# Patient Record
Sex: MALE | Race: WHITE | Employment: FULL TIME | URBAN - METROPOLITAN AREA
[De-identification: names, ages, dates, MRNs, and addresses within clinical notes are randomized per-mention and may not be internally consistent; named-entity substitution may affect disease eponyms.]

---

## 2017-02-23 DIAGNOSIS — N40.0 BENIGN PROSTATIC HYPERPLASIA WITHOUT LOWER URINARY TRACT SYMPTOMS: ICD-10-CM

## 2017-02-27 RX ORDER — SILODOSIN 8 MG/1
CAPSULE ORAL
Qty: 30 CAPSULE | Refills: 1 | Status: SHIPPED | OUTPATIENT
Start: 2017-02-27 | End: 2018-08-27

## 2018-05-09 ENCOUNTER — INITIAL CONSULT (OUTPATIENT)
Dept: PULMONOLOGY | Age: 63
End: 2018-05-09

## 2018-05-09 VITALS
BODY MASS INDEX: 36.64 KG/M2 | SYSTOLIC BLOOD PRESSURE: 126 MMHG | OXYGEN SATURATION: 92 % | WEIGHT: 255.95 LBS | HEIGHT: 70 IN | DIASTOLIC BLOOD PRESSURE: 88 MMHG | HEART RATE: 84 BPM

## 2018-05-09 DIAGNOSIS — E66.9 OBESITY (BMI 30.0-34.9): ICD-10-CM

## 2018-05-09 DIAGNOSIS — G47.33 OSA (OBSTRUCTIVE SLEEP APNEA): ICD-10-CM

## 2018-05-09 DIAGNOSIS — J44.9 CHRONIC OBSTRUCTIVE PULMONARY DISEASE, UNSPECIFIED COPD TYPE (HCC): ICD-10-CM

## 2018-05-09 PROCEDURE — 99214 OFFICE O/P EST MOD 30 MIN: CPT | Performed by: INTERNAL MEDICINE

## 2018-05-09 RX ORDER — LEVOCETIRIZINE DIHYDROCHLORIDE 5 MG/1
5 TABLET, FILM COATED ORAL NIGHTLY
COMMUNITY
End: 2018-08-27

## 2018-05-09 RX ORDER — FLUTICASONE PROPIONATE 50 MCG
SPRAY, SUSPENSION (ML) NASAL
Refills: 4 | COMMUNITY
Start: 2018-04-14 | End: 2020-11-02

## 2018-05-09 RX ORDER — AZELASTINE 1 MG/ML
SPRAY, METERED NASAL
Refills: 4 | COMMUNITY
Start: 2018-04-14 | End: 2018-08-27

## 2018-05-09 RX ORDER — FLUTICASONE FUROATE AND VILANTEROL 200; 25 UG/1; UG/1
POWDER RESPIRATORY (INHALATION)
COMMUNITY
End: 2020-11-02

## 2018-05-09 ASSESSMENT — ENCOUNTER SYMPTOMS
GASTROINTESTINAL NEGATIVE: 1
RESPIRATORY NEGATIVE: 1
EYES NEGATIVE: 1

## 2018-07-03 ENCOUNTER — OFFICE VISIT (OUTPATIENT)
Dept: CARDIOLOGY CLINIC | Age: 63
End: 2018-07-03

## 2018-07-03 VITALS
WEIGHT: 261 LBS | OXYGEN SATURATION: 93 % | DIASTOLIC BLOOD PRESSURE: 82 MMHG | BODY MASS INDEX: 37.45 KG/M2 | HEART RATE: 81 BPM | SYSTOLIC BLOOD PRESSURE: 118 MMHG

## 2018-07-03 DIAGNOSIS — R60.9 EDEMA, UNSPECIFIED TYPE: Primary | ICD-10-CM

## 2018-07-03 DIAGNOSIS — R07.9 CHEST PAIN, UNSPECIFIED TYPE: ICD-10-CM

## 2018-07-03 PROCEDURE — 99204 OFFICE O/P NEW MOD 45 MIN: CPT | Performed by: INTERNAL MEDICINE

## 2018-07-03 PROCEDURE — 93000 ELECTROCARDIOGRAM COMPLETE: CPT | Performed by: INTERNAL MEDICINE

## 2018-07-03 RX ORDER — ALBUTEROL SULFATE 90 UG/1
AEROSOL, METERED RESPIRATORY (INHALATION)
COMMUNITY
End: 2018-08-02 | Stop reason: SDUPTHER

## 2018-07-03 NOTE — PROGRESS NOTES
for Wheezing or Shortness of Breath 360 mL 0    traZODone (DESYREL) 50 MG tablet Take 1 tablet by mouth nightly 20 tablet 0    RAPAFLO 8 MG CAPS TAKE 1 CAPSULE BY MOUTH ONE TIME A DAY  30 capsule 1    meloxicam (MOBIC) 7.5 MG tablet TAKE 1 TABLET BY MOUTH ONE TIME A DAY  30 tablet 4    triamterene-hydrochlorothiazide (MAXZIDE-25) 37.5-25 MG per tablet TAKE 1 TABLET BY MOUTH ONE TIME A DAY  30 tablet 4    albuterol sulfate  (90 BASE) MCG/ACT inhaler Inhale 2 puffs into the lungs every 4 hours as needed for Wheezing 1 Inhaler 5    montelukast (SINGULAIR) 10 MG tablet Take 1 tablet by mouth daily 30 tablet 5    loratadine-pseudoephedrine (CLARITIN-D 12 HOUR) 5-120 MG per tablet Take 1 tablet by mouth 2 times daily 60 tablet 2    terazosin (HYTRIN) 1 MG capsule Take 1 mg by mouth nightly.          No current facility-administered medications for this visit.           Review of Systems:    · Constitutional: No Fever or Weight Loss    · Eyes: No Decreased Vision  · ENT: No Headaches, Hearing Loss or Vertigo  · Cardiovascular: + chest pain, dyspnea on exertion, palpitations or loss of consciousness  · Respiratory: No cough or wheezing    · Gastrointestinal: No abdominal pain, appetite loss, blood in stools, constipation, diarrhea or heartburn  · Genitourinary: No dysuria, trouble voiding, or hematuria  · Musculoskeletal: No gait disturbance, weakness or joint complaints  · Integumentary: No rash or pruritis  · Neurological: No TIA or stroke symptoms  · Psychiatric: No anxiety or depression  · Endocrine: No malaise, fatigue or temperature intolerance  · Hematologic/Lymphatic: No bleeding problems, blood clots or swollen lymph nodes  · Allergic/Immunologic: No nasal congestion or hives  All systems negative except as marked.      ·    ·    ·      Physical Examination:    Vitals:    07/03/18 1701   BP: 118/82   Pulse: 81   SpO2: 93%    rr 14  afebile    Wt Readings from Last 3 Encounters:   07/03/18 261 lb CREATININE  0.8           Recent Labs       09/28/16  0010    AST  12*    ALT  10    BILITOT  0.4    ALKPHOS  124       No results for input(s): TROPONINI in the last 72 hours. No results found for: BNP  No results found for: INR, PROTIME        EKG:nsr     Chest Xray:nad     ECHO:pending  Labs, echo, meds reviewed  Assessment:      Recommendations:     1. Chest pain: out patient stress test and echo  2. Leg swelling: venous doppler ordered,  3. HTN: stable  4. Hearing loss: stable  5. Sleep apnea stable  6. Asthma: relatively less controlled,he get very short of breath, will recommend to follow up with pulmonary  7.  Health maintenance: exerise and diet  All labs, medications and tests reviewed, continue all other medications of all above medical condition listed as is.          Rowdy Cobb MD,   Rowdy Cobb MD, 7/3/2018 5:43 PM

## 2018-07-09 ENCOUNTER — HOSPITAL ENCOUNTER (OUTPATIENT)
Dept: PULMONOLOGY | Age: 63
Discharge: HOME | End: 2018-07-09
Attending: INTERNAL MEDICINE | Admitting: INTERNAL MEDICINE

## 2018-07-10 ENCOUNTER — OFFICE VISIT (OUTPATIENT)
Dept: PULMONOLOGY | Age: 63
End: 2018-07-10

## 2018-07-10 VITALS
WEIGHT: 258.8 LBS | HEIGHT: 70 IN | BODY MASS INDEX: 37.05 KG/M2 | HEART RATE: 103 BPM | OXYGEN SATURATION: 91 % | RESPIRATION RATE: 18 BRPM | SYSTOLIC BLOOD PRESSURE: 122 MMHG | DIASTOLIC BLOOD PRESSURE: 64 MMHG

## 2018-07-10 DIAGNOSIS — G47.33 OSA (OBSTRUCTIVE SLEEP APNEA): ICD-10-CM

## 2018-07-10 DIAGNOSIS — R06.09 DYSPNEA ON EXERTION: ICD-10-CM

## 2018-07-10 DIAGNOSIS — E66.9 OBESITY (BMI 30.0-34.9): ICD-10-CM

## 2018-07-10 DIAGNOSIS — J44.9 CHRONIC OBSTRUCTIVE PULMONARY DISEASE, UNSPECIFIED COPD TYPE (HCC): ICD-10-CM

## 2018-07-10 PROCEDURE — 99214 OFFICE O/P EST MOD 30 MIN: CPT | Performed by: INTERNAL MEDICINE

## 2018-07-10 ASSESSMENT — ENCOUNTER SYMPTOMS
SHORTNESS OF BREATH: 1
GASTROINTESTINAL NEGATIVE: 1
EYES NEGATIVE: 1

## 2018-07-10 NOTE — PROGRESS NOTES
current facility-administered medications for this visit. Allergies   Allergen Reactions    Prednisone        Past Medical History:   Diagnosis Date    Arthritis     Asthma     Chronic back pain     Hearing loss     Hypertension     Ringing in the ears     from measels and mumps       Past Surgical History:   Procedure Laterality Date    COLONOSCOPY  2005    polyp removed Dr Jesus Barksdale History     Social History    Marital status:      Spouse name: N/A    Number of children: N/A    Years of education: N/A     Social History Main Topics    Smoking status: Former Smoker    Smokeless tobacco: Never Used      Comment: See Soft Chart     Alcohol use No    Drug use: Yes    Sexual activity: Yes     Partners: Female     Other Topics Concern    None     Social History Narrative    None       Review of Systems   Constitutional: Negative. HENT: Negative. Eyes: Negative. Respiratory: Positive for shortness of breath. Cardiovascular: Negative. Gastrointestinal: Negative. Genitourinary: Negative. Musculoskeletal: Negative. Skin: Negative. Neurological: Negative. Endo/Heme/Allergies: Negative. Psychiatric/Behavioral: Negative. Objective:     Vitals:    07/10/18 1450   BP: 122/64   Pulse: 103   Resp: 18   SpO2: 91%     Body mass index is 37.13 kg/m². No flowsheet data found. Mallampati 3    Physical Exam   Constitutional: He is oriented to person, place, and time. He appears well-developed and well-nourished. Obese   HENT:   Head: Normocephalic and atraumatic. Eyes: Conjunctivae are normal. Pupils are equal, round, and reactive to light. Neck: Normal range of motion. Neck supple. Cardiovascular: Normal rate, regular rhythm and normal heart sounds. Pulmonary/Chest: Effort normal and breath sounds normal.   Abdominal: Soft.  Bowel sounds are normal.   Musculoskeletal: Normal range

## 2018-07-11 ENCOUNTER — PROCEDURE VISIT (OUTPATIENT)
Dept: CARDIOLOGY CLINIC | Age: 63
End: 2018-07-11

## 2018-07-11 DIAGNOSIS — R07.9 CHEST PAIN, UNSPECIFIED TYPE: ICD-10-CM

## 2018-07-11 DIAGNOSIS — R60.9 EDEMA, UNSPECIFIED TYPE: ICD-10-CM

## 2018-07-11 LAB
LV EF: 62 %
LVEF MODALITY: NORMAL

## 2018-07-11 PROCEDURE — 78452 HT MUSCLE IMAGE SPECT MULT: CPT | Performed by: INTERNAL MEDICINE

## 2018-07-11 PROCEDURE — 93015 CV STRESS TEST SUPVJ I&R: CPT | Performed by: INTERNAL MEDICINE

## 2018-07-11 PROCEDURE — A9500 TC99M SESTAMIBI: HCPCS | Performed by: INTERNAL MEDICINE

## 2018-07-12 ENCOUNTER — TELEPHONE (OUTPATIENT)
Dept: CARDIOLOGY CLINIC | Age: 63
End: 2018-07-12

## 2018-07-12 NOTE — TELEPHONE ENCOUNTER
Left message with pts nuclear results    Summary    Supervising physician Dr. Salome Johnson .   Uriel Mylar study suggestive of normal perfusion in distribution of all    coronaries and normal left ventricular size and function. Ejection fraction    is 62 %.    Exercise tolerance is good. Normal hemodynamic response to exercise.        Recommendation    Continue lifestyle and risk modification for primary prevention.

## 2018-07-20 ENCOUNTER — PROCEDURE VISIT (OUTPATIENT)
Dept: CARDIOLOGY CLINIC | Age: 63
End: 2018-07-20

## 2018-07-20 DIAGNOSIS — R60.9 EDEMA, UNSPECIFIED TYPE: ICD-10-CM

## 2018-07-20 DIAGNOSIS — R07.9 CHEST PAIN, UNSPECIFIED TYPE: Primary | ICD-10-CM

## 2018-07-20 DIAGNOSIS — M79.89 LEG SWELLING: Primary | ICD-10-CM

## 2018-07-20 LAB
LV EF: 58 %
LVEF MODALITY: NORMAL

## 2018-07-20 PROCEDURE — 93970 EXTREMITY STUDY: CPT | Performed by: INTERNAL MEDICINE

## 2018-07-20 PROCEDURE — 93306 TTE W/DOPPLER COMPLETE: CPT | Performed by: INTERNAL MEDICINE

## 2018-07-23 ENCOUNTER — TELEPHONE (OUTPATIENT)
Dept: CARDIOLOGY CLINIC | Age: 63
End: 2018-07-23

## 2018-08-02 ENCOUNTER — OFFICE VISIT (OUTPATIENT)
Dept: CARDIOLOGY CLINIC | Age: 63
End: 2018-08-02

## 2018-08-02 VITALS
BODY MASS INDEX: 37.31 KG/M2 | SYSTOLIC BLOOD PRESSURE: 124 MMHG | HEART RATE: 100 BPM | DIASTOLIC BLOOD PRESSURE: 80 MMHG | WEIGHT: 260.6 LBS | HEIGHT: 70 IN

## 2018-08-02 DIAGNOSIS — R06.00 DYSPNEA, UNSPECIFIED TYPE: Primary | ICD-10-CM

## 2018-08-02 PROCEDURE — 99214 OFFICE O/P EST MOD 30 MIN: CPT | Performed by: INTERNAL MEDICINE

## 2018-08-02 NOTE — PROGRESS NOTES
facility-administered medications for this visit. Allergies: Prednisone  Past Medical History:   Diagnosis Date    Arthritis     Asthma     Chronic back pain     Hearing loss     History of Doppler ultrasound 07/20/2018    No significant venous insufficiency, No DVT or SVT    History of echocardiogram 07/20/2018    EF 55-60%, No significant valvular disease, no pericardial effusion, Mild PHTN at 42 mmHg.  History of nuclear stress test 07/11/2018    EF 62%    Hypertension     Ringing in the ears     from measels and mumps     Past Surgical History:   Procedure Laterality Date    COLONOSCOPY  2005    polyp removed Dr Washburn Standing History   Problem Relation Age of Onset    High Blood Pressure Mother     Osteoporosis Mother     Heart Disease Father     Colon Cancer Brother      Social History   Substance Use Topics    Smoking status: Former Smoker    Smokeless tobacco: Never Used      Comment: See Soft Chart     Alcohol use No      [unfilled]  Review of Systems:   · Constitutional: No Fever or Weight Loss   · Eyes: No Decreased Vision  · ENT: No Headaches, Hearing Loss or Vertigo  · Cardiovascular: No chest pain, +dyspnea on exertion, palpitations or loss of consciousness  · Respiratory: No cough or wheezing    · Gastrointestinal: No abdominal pain, appetite loss, blood in stools, constipation, diarrhea or heartburn  · Genitourinary: No dysuria, trouble voiding, or hematuria  · Musculoskeletal:  No gait disturbance, weakness or joint complaints  · Integumentary: No rash or pruritis  · Neurological: No TIA or stroke symptoms  · Psychiatric: No anxiety or depression  · Endocrine: No malaise, fatigue or temperature intolerance  · Hematologic/Lymphatic: No bleeding problems, blood clots or swollen lymph nodes  · Allergic/Immunologic: No nasal congestion or hives  All systems negative except as marked.    Objective:  /80 (Site: Left Arm, Position: Sitting, Cuff Size: Large Adult)   Pulse 100   Ht 5' 10\" (1.778 m)   Wt 260 lb 9.6 oz (118.2 kg)   BMI 37.39 kg/m²   Wt Readings from Last 3 Encounters:   08/02/18 260 lb 9.6 oz (118.2 kg)   07/10/18 258 lb 12.8 oz (117.4 kg)   07/03/18 261 lb (118.4 kg)     Body mass index is 37.39 kg/m². GENERAL - Alert, oriented, pleasant, in no apparent distress,normal grooming  HEENT  pupils are reactive to light and accomodation, cornea intact, conjunctive normal color, ears are normal in exam,throat exam in normal, teeth, gum and palate are normal, oral mucosa is normal without any notation of pallor or cyanosis  Neck - Supple. No jugular venous distention noted. No carotid bruits, no apical -carotid delay  Respiratory:  Normal breath sounds, No respiratory distress, No wheezing, No chest tenderness. ,no use of accessory muscles, diaphragm movement is normal  Cardiovascular: (PMI) apex non displaced,no lifts no thrills, no s3,no s4, Normal heart rate, Normal rhythm, No murmurs, No rubs, No gallops. Carotid arteries pulse and amplitude are normal no bruit, no abdominal bruit noted ( normal abdominal aorta ausculation), femoral arteries pulse and amplitude are normal no bruit, pedal pulses are normal  Femoral pulses have normal amplitude, no bruits   Extremities - No cyanosis, clubbing, or significant edema, no varicose veins    Abdomen  No masses, tenderness, or organomegaly, no hepato-splenomegally, no bruits  Musculoskeletal  No significant edema, no kyphosis or scoliosis, no deformity in any extremity noted, muscle strength and tone are normal  Skin: no ulcer,no scar,no stasis dermatitis   Neurologic  alert oriented times 3,Cranial nerves II through XII are grossly intact. There were no gross focal neurologic abnormalities.  All sensory and motor nerves examined and were normal  Psychiatric: normal mood and affect    Lab Results   Component Value Date    CKTOTAL 115 03/19/2012    CKMB 0.4 03/19/2012

## 2018-08-02 NOTE — PROGRESS NOTES
Patient here in office and educated on [facility], schedule for 08/09/2018 @ 1000, with arrival @ 0800, @ [facility]; risk explained; and consents signed. Also copy of orders given for labs and CXR due 08/08/2018 at [facility]. Instruction given to patient to :  NPO after midnight the night before procedure; call hospital at [phone] to pre-register. May take rest of morning meds of procedure except maxide. . Patient voiced understanding. Copies of consent & info sheet given to [facility] for scanning.

## 2018-08-07 ENCOUNTER — HOSPITAL ENCOUNTER (OUTPATIENT)
Dept: GENERAL RADIOLOGY | Age: 63
Discharge: HOME | End: 2018-08-07
Attending: INTERNAL MEDICINE | Admitting: INTERNAL MEDICINE

## 2018-08-07 DIAGNOSIS — Z01.811 PRE-OP CHEST EXAM: ICD-10-CM

## 2018-08-07 LAB
ANION GAP SERPL CALCULATED.3IONS-SCNC: 17 MMOL/L (ref 4–16)
APTT: 28.9 SECONDS (ref 21.2–33)
BASOPHILS ABSOLUTE: 0.1 K/CU MM
BASOPHILS RELATIVE PERCENT: 0.6 % (ref 0–1)
BUN BLDV-MCNC: 24 MG/DL (ref 6–23)
CALCIUM SERPL-MCNC: 9.7 MG/DL (ref 8.3–10.6)
CHLORIDE BLD-SCNC: 101 MMOL/L (ref 99–110)
CO2: 25 MMOL/L (ref 21–32)
CREAT SERPL-MCNC: 1.2 MG/DL (ref 0.9–1.3)
DIFFERENTIAL TYPE: ABNORMAL
EOSINOPHILS ABSOLUTE: 0.2 K/CU MM
EOSINOPHILS RELATIVE PERCENT: 1.3 % (ref 0–3)
GFR AFRICAN AMERICAN: >60 ML/MIN/1.73M2
GFR NON-AFRICAN AMERICAN: >60 ML/MIN/1.73M2
GLUCOSE BLD-MCNC: 101 MG/DL (ref 70–99)
HCT VFR BLD CALC: 48.1 % (ref 42–52)
HEMOGLOBIN: 15.8 GM/DL (ref 13.5–18)
IMMATURE NEUTROPHIL %: 0.3 % (ref 0–0.43)
INR BLD: 0.96 INDEX
LYMPHOCYTES ABSOLUTE: 3.2 K/CU MM
LYMPHOCYTES RELATIVE PERCENT: 25.6 % (ref 24–44)
MCH RBC QN AUTO: 29.2 PG (ref 27–31)
MCHC RBC AUTO-ENTMCNC: 32.8 % (ref 32–36)
MCV RBC AUTO: 88.7 FL (ref 78–100)
MONOCYTES ABSOLUTE: 0.8 K/CU MM
MONOCYTES RELATIVE PERCENT: 6 % (ref 0–4)
NUCLEATED RBC %: 0 %
PDW BLD-RTO: 13 % (ref 11.7–14.9)
PLATELET # BLD: 351 K/CU MM (ref 140–440)
PMV BLD AUTO: 9.6 FL (ref 7.5–11.1)
POTASSIUM SERPL-SCNC: 3.9 MMOL/L (ref 3.5–5.1)
PROTHROMBIN TIME: 11 SECONDS (ref 9.12–12.5)
RBC # BLD: 5.42 M/CU MM (ref 4.6–6.2)
SEGMENTED NEUTROPHILS ABSOLUTE COUNT: 8.4 K/CU MM
SEGMENTED NEUTROPHILS RELATIVE PERCENT: 66.2 % (ref 36–66)
SODIUM BLD-SCNC: 143 MMOL/L (ref 135–145)
TOTAL IMMATURE NEUTOROPHIL: 0.04 K/CU MM
TOTAL NUCLEATED RBC: 0 K/CU MM
WBC # BLD: 12.6 K/CU MM (ref 4–10.5)

## 2018-08-27 ENCOUNTER — OFFICE VISIT (OUTPATIENT)
Dept: CARDIOLOGY CLINIC | Age: 63
End: 2018-08-27

## 2018-08-27 VITALS
HEART RATE: 88 BPM | BODY MASS INDEX: 36.71 KG/M2 | HEIGHT: 70 IN | WEIGHT: 256.4 LBS | DIASTOLIC BLOOD PRESSURE: 76 MMHG | SYSTOLIC BLOOD PRESSURE: 106 MMHG

## 2018-08-27 DIAGNOSIS — R07.89 OTHER CHEST PAIN: Primary | ICD-10-CM

## 2018-08-27 PROCEDURE — 99214 OFFICE O/P EST MOD 30 MIN: CPT | Performed by: INTERNAL MEDICINE

## 2018-08-27 RX ORDER — ALBUTEROL SULFATE 90 UG/1
2 AEROSOL, METERED RESPIRATORY (INHALATION) EVERY 6 HOURS PRN
COMMUNITY

## 2018-08-27 RX ORDER — MELOXICAM 15 MG/1
15 TABLET ORAL DAILY
COMMUNITY
End: 2020-11-02

## 2018-08-27 NOTE — PROGRESS NOTES
mmHg.    History of left heart catheterization 08/09/2018    History of nuclear stress test 07/11/2018    EF 62%    Hypertension     Ringing in the ears     from measels and mumps     Past Surgical History:   Procedure Laterality Date    COLONOSCOPY  2005    polyp removed Dr Param Hernandez History   Problem Relation Age of Onset    High Blood Pressure Mother     Osteoporosis Mother     Heart Disease Father     Colon Cancer Brother      Social History   Substance Use Topics    Smoking status: Former Smoker    Smokeless tobacco: Never Used      Comment: See Soft Chart     Alcohol use No      [unfilled]  Review of Systems:   · Constitutional: No Fever or Weight Loss   · Eyes: No Decreased Vision  · ENT: No Headaches, Hearing Loss or Vertigo  · Cardiovascular: No chest pain, dyspnea on exertion, palpitations or loss of consciousness  · Respiratory: No cough or wheezing    · Gastrointestinal: No abdominal pain, appetite loss, blood in stools, constipation, diarrhea or heartburn  · Genitourinary: No dysuria, trouble voiding, or hematuria  · Musculoskeletal:  No gait disturbance, weakness or joint complaints  · Integumentary: No rash or pruritis  · Neurological: No TIA or stroke symptoms  · Psychiatric: No anxiety or depression  · Endocrine: No malaise, fatigue or temperature intolerance  · Hematologic/Lymphatic: No bleeding problems, blood clots or swollen lymph nodes  · Allergic/Immunologic: No nasal congestion or hives  All systems negative except as marked. Objective:  /76 (Site: Left Arm, Position: Sitting, Cuff Size: Large Adult)   Pulse 88   Ht 5' 10\" (1.778 m)   Wt 256 lb 6.4 oz (116.3 kg)   BMI 36.79 kg/m²   Wt Readings from Last 3 Encounters:   08/27/18 256 lb 6.4 oz (116.3 kg)   08/09/18 260 lb (117.9 kg)   08/02/18 260 lb 9.6 oz (118.2 kg)     Body mass index is 36.79 kg/m².   GENERAL - Alert, oriented, pleasant, in no apparent

## 2019-02-26 ENCOUNTER — OFFICE VISIT (OUTPATIENT)
Dept: CARDIOLOGY CLINIC | Age: 64
End: 2019-02-26
Payer: COMMERCIAL

## 2019-02-26 VITALS
WEIGHT: 257 LBS | DIASTOLIC BLOOD PRESSURE: 82 MMHG | HEIGHT: 68 IN | SYSTOLIC BLOOD PRESSURE: 130 MMHG | BODY MASS INDEX: 38.95 KG/M2 | HEART RATE: 78 BPM | OXYGEN SATURATION: 98 %

## 2019-02-26 DIAGNOSIS — I10 ESSENTIAL HYPERTENSION: Primary | ICD-10-CM

## 2019-02-26 PROCEDURE — 99214 OFFICE O/P EST MOD 30 MIN: CPT | Performed by: INTERNAL MEDICINE

## 2019-10-23 ENCOUNTER — TELEPHONE (OUTPATIENT)
Dept: CARDIOLOGY CLINIC | Age: 64
End: 2019-10-23

## 2020-05-20 ENCOUNTER — HOSPITAL ENCOUNTER (OUTPATIENT)
Age: 65
Discharge: HOME | End: 2020-05-20
Payer: COMMERCIAL

## 2020-05-20 LAB
ANION GAP SERPL CALCULATED.3IONS-SCNC: 13 MMOL/L (ref 4–16)
BASOPHILS ABSOLUTE: 0.1 K/CU MM
BASOPHILS RELATIVE PERCENT: 0.5 % (ref 0–1)
BUN BLDV-MCNC: 15 MG/DL (ref 6–23)
CALCIUM SERPL-MCNC: 9.2 MG/DL (ref 8.3–10.6)
CHLORIDE BLD-SCNC: 100 MMOL/L (ref 99–110)
CO2: 26 MMOL/L (ref 21–32)
CREAT SERPL-MCNC: 1.1 MG/DL (ref 0.9–1.3)
DIFFERENTIAL TYPE: ABNORMAL
EOSINOPHILS ABSOLUTE: 0.2 K/CU MM
EOSINOPHILS RELATIVE PERCENT: 1.8 % (ref 0–3)
GFR AFRICAN AMERICAN: >60 ML/MIN/1.73M2
GFR NON-AFRICAN AMERICAN: >60 ML/MIN/1.73M2
GLUCOSE BLD-MCNC: 105 MG/DL (ref 70–99)
HCT VFR BLD CALC: 46.5 % (ref 42–52)
HEMOGLOBIN: 15.2 GM/DL (ref 13.5–18)
IMMATURE NEUTROPHIL %: 0.6 % (ref 0–0.43)
LYMPHOCYTES ABSOLUTE: 2.7 K/CU MM
LYMPHOCYTES RELATIVE PERCENT: 20.5 % (ref 24–44)
MCH RBC QN AUTO: 28.1 PG (ref 27–31)
MCHC RBC AUTO-ENTMCNC: 32.7 % (ref 32–36)
MCV RBC AUTO: 86 FL (ref 78–100)
MONOCYTES ABSOLUTE: 0.9 K/CU MM
MONOCYTES RELATIVE PERCENT: 7.2 % (ref 0–4)
NUCLEATED RBC %: 0 %
PDW BLD-RTO: 13.8 % (ref 11.7–14.9)
PLATELET # BLD: 355 K/CU MM (ref 140–440)
PMV BLD AUTO: 9.7 FL (ref 7.5–11.1)
POTASSIUM SERPL-SCNC: 4.2 MMOL/L (ref 3.5–5.1)
PRO-BNP: 32.95 PG/ML
RBC # BLD: 5.41 M/CU MM (ref 4.6–6.2)
SEGMENTED NEUTROPHILS ABSOLUTE COUNT: 9 K/CU MM
SEGMENTED NEUTROPHILS RELATIVE PERCENT: 69.4 % (ref 36–66)
SODIUM BLD-SCNC: 139 MMOL/L (ref 135–145)
TOTAL IMMATURE NEUTOROPHIL: 0.08 K/CU MM
TOTAL NUCLEATED RBC: 0 K/CU MM
WBC # BLD: 13 K/CU MM (ref 4–10.5)

## 2020-05-20 PROCEDURE — 80048 BASIC METABOLIC PNL TOTAL CA: CPT

## 2020-05-20 PROCEDURE — 83880 ASSAY OF NATRIURETIC PEPTIDE: CPT

## 2020-05-20 PROCEDURE — 36415 COLL VENOUS BLD VENIPUNCTURE: CPT

## 2020-05-20 PROCEDURE — 85025 COMPLETE CBC W/AUTO DIFF WBC: CPT

## 2020-05-22 ENCOUNTER — TELEPHONE (OUTPATIENT)
Dept: CARDIOLOGY CLINIC | Age: 65
End: 2020-05-22

## 2020-06-10 ENCOUNTER — PROCEDURE VISIT (OUTPATIENT)
Dept: CARDIOLOGY CLINIC | Age: 65
End: 2020-06-10
Payer: COMMERCIAL

## 2020-06-10 LAB
LV EF: 58 %
LVEF MODALITY: NORMAL

## 2020-06-10 PROCEDURE — 93306 TTE W/DOPPLER COMPLETE: CPT | Performed by: INTERNAL MEDICINE

## 2020-11-02 ENCOUNTER — HOSPITAL ENCOUNTER (INPATIENT)
Age: 65
LOS: 1 days | Discharge: HOME | DRG: 418 | End: 2020-11-03
Attending: EMERGENCY MEDICINE | Admitting: INTERNAL MEDICINE
Payer: COMMERCIAL

## 2020-11-02 ENCOUNTER — APPOINTMENT (OUTPATIENT)
Dept: ULTRASOUND IMAGING | Age: 65
DRG: 418 | End: 2020-11-02
Payer: COMMERCIAL

## 2020-11-02 ENCOUNTER — APPOINTMENT (OUTPATIENT)
Dept: CT IMAGING | Age: 65
DRG: 418 | End: 2020-11-02
Payer: COMMERCIAL

## 2020-11-02 ENCOUNTER — ANESTHESIA (OUTPATIENT)
Dept: OPERATING ROOM | Age: 65
DRG: 418 | End: 2020-11-02
Payer: COMMERCIAL

## 2020-11-02 ENCOUNTER — APPOINTMENT (OUTPATIENT)
Dept: GENERAL RADIOLOGY | Age: 65
DRG: 418 | End: 2020-11-02
Payer: COMMERCIAL

## 2020-11-02 ENCOUNTER — ANESTHESIA EVENT (OUTPATIENT)
Dept: OPERATING ROOM | Age: 65
DRG: 418 | End: 2020-11-02
Payer: COMMERCIAL

## 2020-11-02 VITALS
TEMPERATURE: 97.5 F | SYSTOLIC BLOOD PRESSURE: 155 MMHG | RESPIRATION RATE: 11 BRPM | DIASTOLIC BLOOD PRESSURE: 102 MMHG | OXYGEN SATURATION: 98 %

## 2020-11-02 PROBLEM — R10.9 ABDOMINAL PAIN: Status: ACTIVE | Noted: 2020-11-02

## 2020-11-02 LAB
ALBUMIN SERPL-MCNC: 4.1 GM/DL (ref 3.4–5)
ALP BLD-CCNC: 82 IU/L (ref 40–129)
ALT SERPL-CCNC: 14 U/L (ref 10–40)
AMORPHOUS: ABNORMAL /HPF
ANION GAP SERPL CALCULATED.3IONS-SCNC: 12 MMOL/L (ref 4–16)
AST SERPL-CCNC: 13 IU/L (ref 15–37)
BACTERIA: NEGATIVE /HPF
BASOPHILS ABSOLUTE: 0.1 K/CU MM
BASOPHILS RELATIVE PERCENT: 0.5 % (ref 0–1)
BILIRUB SERPL-MCNC: 0.4 MG/DL (ref 0–1)
BILIRUBIN DIRECT: 0.2 MG/DL (ref 0–0.3)
BILIRUBIN URINE: NEGATIVE MG/DL
BILIRUBIN, INDIRECT: 0.2 MG/DL (ref 0–0.7)
BLOOD, URINE: NEGATIVE
BUN BLDV-MCNC: 18 MG/DL (ref 6–23)
CALCIUM SERPL-MCNC: 9.2 MG/DL (ref 8.3–10.6)
CHLORIDE BLD-SCNC: 100 MMOL/L (ref 99–110)
CLARITY: ABNORMAL
CO2: 23 MMOL/L (ref 21–32)
COLOR: YELLOW
CREAT SERPL-MCNC: 0.9 MG/DL (ref 0.9–1.3)
DIFFERENTIAL TYPE: ABNORMAL
EOSINOPHILS ABSOLUTE: 0.1 K/CU MM
EOSINOPHILS RELATIVE PERCENT: 0.6 % (ref 0–3)
GFR AFRICAN AMERICAN: >60 ML/MIN/1.73M2
GFR NON-AFRICAN AMERICAN: >60 ML/MIN/1.73M2
GLUCOSE BLD-MCNC: 154 MG/DL (ref 70–99)
GLUCOSE, URINE: NEGATIVE MG/DL
HCT VFR BLD CALC: 46.4 % (ref 42–52)
HEMOGLOBIN: 16 GM/DL (ref 13.5–18)
IMMATURE NEUTROPHIL %: 0.5 % (ref 0–0.43)
KETONES, URINE: NEGATIVE MG/DL
LACTATE: 2.4 MMOL/L (ref 0.4–2)
LEUKOCYTE ESTERASE, URINE: NEGATIVE
LIPASE: 18 IU/L (ref 13–60)
LYMPHOCYTES ABSOLUTE: 2.1 K/CU MM
LYMPHOCYTES RELATIVE PERCENT: 13.3 % (ref 24–44)
MCH RBC QN AUTO: 29.5 PG (ref 27–31)
MCHC RBC AUTO-ENTMCNC: 34.5 % (ref 32–36)
MCV RBC AUTO: 85.5 FL (ref 78–100)
MONOCYTES ABSOLUTE: 0.7 K/CU MM
MONOCYTES RELATIVE PERCENT: 4.7 % (ref 0–4)
MUCUS: ABNORMAL HPF
NITRITE URINE, QUANTITATIVE: NEGATIVE
NUCLEATED RBC %: 0 %
PDW BLD-RTO: 13.8 % (ref 11.7–14.9)
PH, URINE: 7 (ref 5–8)
PLATELET # BLD: 351 K/CU MM (ref 140–440)
PMV BLD AUTO: 9.7 FL (ref 7.5–11.1)
POTASSIUM SERPL-SCNC: 3.9 MMOL/L (ref 3.5–5.1)
PROTEIN UA: NEGATIVE MG/DL
RBC # BLD: 5.43 M/CU MM (ref 4.6–6.2)
RBC URINE: ABNORMAL /HPF (ref 0–3)
SARS-COV-2, NAAT: NOT DETECTED
SEGMENTED NEUTROPHILS ABSOLUTE COUNT: 12.4 K/CU MM
SEGMENTED NEUTROPHILS RELATIVE PERCENT: 80.4 % (ref 36–66)
SODIUM BLD-SCNC: 135 MMOL/L (ref 135–145)
SPECIFIC GRAVITY UA: 1.02 (ref 1–1.03)
TOTAL IMMATURE NEUTOROPHIL: 0.08 K/CU MM
TOTAL NUCLEATED RBC: 0 K/CU MM
TOTAL PROTEIN: 6.9 GM/DL (ref 6.4–8.2)
TRICHOMONAS: ABNORMAL /HPF
TROPONIN T: <0.01 NG/ML
UROBILINOGEN, URINE: NORMAL MG/DL (ref 0.2–1)
WBC # BLD: 15.4 K/CU MM (ref 4–10.5)
WBC UA: ABNORMAL /HPF (ref 0–2)

## 2020-11-02 PROCEDURE — 0FT44ZZ RESECTION OF GALLBLADDER, PERCUTANEOUS ENDOSCOPIC APPROACH: ICD-10-PCS | Performed by: SURGERY

## 2020-11-02 PROCEDURE — 3700000000 HC ANESTHESIA ATTENDED CARE: Performed by: SURGERY

## 2020-11-02 PROCEDURE — 2700000000 HC OXYGEN THERAPY PER DAY

## 2020-11-02 PROCEDURE — 6360000002 HC RX W HCPCS: Performed by: ANESTHESIOLOGY

## 2020-11-02 PROCEDURE — 94150 VITAL CAPACITY TEST: CPT

## 2020-11-02 PROCEDURE — 2500000003 HC RX 250 WO HCPCS: Performed by: NURSE ANESTHETIST, CERTIFIED REGISTERED

## 2020-11-02 PROCEDURE — 6360000002 HC RX W HCPCS: Performed by: EMERGENCY MEDICINE

## 2020-11-02 PROCEDURE — 96375 TX/PRO/DX INJ NEW DRUG ADDON: CPT

## 2020-11-02 PROCEDURE — 6360000004 HC RX CONTRAST MEDICATION: Performed by: EMERGENCY MEDICINE

## 2020-11-02 PROCEDURE — 3600000004 HC SURGERY LEVEL 4 BASE: Performed by: SURGERY

## 2020-11-02 PROCEDURE — 93005 ELECTROCARDIOGRAM TRACING: CPT | Performed by: EMERGENCY MEDICINE

## 2020-11-02 PROCEDURE — 6360000002 HC RX W HCPCS: Performed by: NURSE ANESTHETIST, CERTIFIED REGISTERED

## 2020-11-02 PROCEDURE — 6370000000 HC RX 637 (ALT 250 FOR IP): Performed by: SURGERY

## 2020-11-02 PROCEDURE — 6360000002 HC RX W HCPCS: Performed by: NURSE PRACTITIONER

## 2020-11-02 PROCEDURE — 88304 TISSUE EXAM BY PATHOLOGIST: CPT | Performed by: PATHOLOGY

## 2020-11-02 PROCEDURE — 99285 EMERGENCY DEPT VISIT HI MDM: CPT

## 2020-11-02 PROCEDURE — 47562 LAPAROSCOPIC CHOLECYSTECTOMY: CPT | Performed by: SURGERY

## 2020-11-02 PROCEDURE — 82248 BILIRUBIN DIRECT: CPT

## 2020-11-02 PROCEDURE — 71045 X-RAY EXAM CHEST 1 VIEW: CPT

## 2020-11-02 PROCEDURE — 84484 ASSAY OF TROPONIN QUANT: CPT

## 2020-11-02 PROCEDURE — 1200000000 HC SEMI PRIVATE

## 2020-11-02 PROCEDURE — 82805 BLOOD GASES W/O2 SATURATION: CPT

## 2020-11-02 PROCEDURE — 83690 ASSAY OF LIPASE: CPT

## 2020-11-02 PROCEDURE — 2780000010 HC IMPLANT OTHER: Performed by: SURGERY

## 2020-11-02 PROCEDURE — 94664 DEMO&/EVAL PT USE INHALER: CPT

## 2020-11-02 PROCEDURE — 2580000003 HC RX 258: Performed by: EMERGENCY MEDICINE

## 2020-11-02 PROCEDURE — 94761 N-INVAS EAR/PLS OXIMETRY MLT: CPT

## 2020-11-02 PROCEDURE — 2500000003 HC RX 250 WO HCPCS: Performed by: SURGERY

## 2020-11-02 PROCEDURE — 81001 URINALYSIS AUTO W/SCOPE: CPT

## 2020-11-02 PROCEDURE — 99253 IP/OBS CNSLTJ NEW/EST LOW 45: CPT | Performed by: SURGERY

## 2020-11-02 PROCEDURE — 93010 ELECTROCARDIOGRAM REPORT: CPT | Performed by: INTERNAL MEDICINE

## 2020-11-02 PROCEDURE — 7100000000 HC PACU RECOVERY - FIRST 15 MIN: Performed by: SURGERY

## 2020-11-02 PROCEDURE — U0002 COVID-19 LAB TEST NON-CDC: HCPCS

## 2020-11-02 PROCEDURE — 3700000001 HC ADD 15 MINUTES (ANESTHESIA): Performed by: SURGERY

## 2020-11-02 PROCEDURE — 80053 COMPREHEN METABOLIC PANEL: CPT

## 2020-11-02 PROCEDURE — 2580000003 HC RX 258: Performed by: NURSE PRACTITIONER

## 2020-11-02 PROCEDURE — 76705 ECHO EXAM OF ABDOMEN: CPT

## 2020-11-02 PROCEDURE — 2709999900 HC NON-CHARGEABLE SUPPLY: Performed by: SURGERY

## 2020-11-02 PROCEDURE — 2580000003 HC RX 258: Performed by: ANESTHESIOLOGY

## 2020-11-02 PROCEDURE — 96376 TX/PRO/DX INJ SAME DRUG ADON: CPT

## 2020-11-02 PROCEDURE — 7100000001 HC PACU RECOVERY - ADDTL 15 MIN: Performed by: SURGERY

## 2020-11-02 PROCEDURE — 2580000003 HC RX 258: Performed by: NURSE ANESTHETIST, CERTIFIED REGISTERED

## 2020-11-02 PROCEDURE — 74177 CT ABD & PELVIS W/CONTRAST: CPT

## 2020-11-02 PROCEDURE — 96365 THER/PROPH/DIAG IV INF INIT: CPT

## 2020-11-02 PROCEDURE — 85025 COMPLETE CBC W/AUTO DIFF WBC: CPT

## 2020-11-02 PROCEDURE — 83605 ASSAY OF LACTIC ACID: CPT

## 2020-11-02 PROCEDURE — 3600000014 HC SURGERY LEVEL 4 ADDTL 15MIN: Performed by: SURGERY

## 2020-11-02 PROCEDURE — 47562 LAPAROSCOPIC CHOLECYSTECTOMY: CPT | Performed by: NURSE PRACTITIONER

## 2020-11-02 PROCEDURE — 2580000003 HC RX 258: Performed by: SURGERY

## 2020-11-02 RX ORDER — MEPERIDINE HYDROCHLORIDE 25 MG/ML
12.5 INJECTION INTRAMUSCULAR; INTRAVENOUS; SUBCUTANEOUS EVERY 5 MIN PRN
Status: DISCONTINUED | OUTPATIENT
Start: 2020-11-02 | End: 2020-11-02 | Stop reason: HOSPADM

## 2020-11-02 RX ORDER — HYDROMORPHONE HCL 110MG/55ML
0.5 PATIENT CONTROLLED ANALGESIA SYRINGE INTRAVENOUS ONCE
Status: COMPLETED | OUTPATIENT
Start: 2020-11-02 | End: 2020-11-02

## 2020-11-02 RX ORDER — MORPHINE SULFATE 2 MG/ML
2 INJECTION, SOLUTION INTRAMUSCULAR; INTRAVENOUS EVERY 4 HOURS PRN
Status: DISCONTINUED | OUTPATIENT
Start: 2020-11-02 | End: 2020-11-03 | Stop reason: HOSPADM

## 2020-11-02 RX ORDER — POLYETHYLENE GLYCOL 3350 17 G/17G
17 POWDER, FOR SOLUTION ORAL DAILY PRN
Status: DISCONTINUED | OUTPATIENT
Start: 2020-11-02 | End: 2020-11-03 | Stop reason: HOSPADM

## 2020-11-02 RX ORDER — SODIUM CHLORIDE 9 MG/ML
INJECTION, SOLUTION INTRAVENOUS CONTINUOUS
Status: DISCONTINUED | OUTPATIENT
Start: 2020-11-02 | End: 2020-11-03 | Stop reason: HOSPADM

## 2020-11-02 RX ORDER — ONDANSETRON 2 MG/ML
4 INJECTION INTRAMUSCULAR; INTRAVENOUS
Status: DISCONTINUED | OUTPATIENT
Start: 2020-11-02 | End: 2020-11-02 | Stop reason: HOSPADM

## 2020-11-02 RX ORDER — HYDROCODONE BITARTRATE AND ACETAMINOPHEN 5; 325 MG/1; MG/1
1 TABLET ORAL PRN
Status: DISCONTINUED | OUTPATIENT
Start: 2020-11-02 | End: 2020-11-02 | Stop reason: HOSPADM

## 2020-11-02 RX ORDER — BUPIVACAINE HYDROCHLORIDE 5 MG/ML
INJECTION, SOLUTION EPIDURAL; INTRACAUDAL
Status: COMPLETED | OUTPATIENT
Start: 2020-11-02 | End: 2020-11-02

## 2020-11-02 RX ORDER — ONDANSETRON 2 MG/ML
4 INJECTION INTRAMUSCULAR; INTRAVENOUS EVERY 6 HOURS PRN
Status: DISCONTINUED | OUTPATIENT
Start: 2020-11-02 | End: 2020-11-03 | Stop reason: HOSPADM

## 2020-11-02 RX ORDER — ONDANSETRON 2 MG/ML
4 INJECTION INTRAMUSCULAR; INTRAVENOUS EVERY 6 HOURS PRN
Status: DISCONTINUED | OUTPATIENT
Start: 2020-11-02 | End: 2020-11-02 | Stop reason: SDUPTHER

## 2020-11-02 RX ORDER — HYDRALAZINE HYDROCHLORIDE 20 MG/ML
5 INJECTION INTRAMUSCULAR; INTRAVENOUS EVERY 10 MIN PRN
Status: DISCONTINUED | OUTPATIENT
Start: 2020-11-02 | End: 2020-11-02 | Stop reason: HOSPADM

## 2020-11-02 RX ORDER — SODIUM CHLORIDE 0.9 % (FLUSH) 0.9 %
10 SYRINGE (ML) INJECTION 2 TIMES DAILY
Status: DISCONTINUED | OUTPATIENT
Start: 2020-11-02 | End: 2020-11-03 | Stop reason: HOSPADM

## 2020-11-02 RX ORDER — ESMOLOL HYDROCHLORIDE 10 MG/ML
INJECTION INTRAVENOUS PRN
Status: DISCONTINUED | OUTPATIENT
Start: 2020-11-02 | End: 2020-11-02 | Stop reason: SDUPTHER

## 2020-11-02 RX ORDER — 0.9 % SODIUM CHLORIDE 0.9 %
500 INTRAVENOUS SOLUTION INTRAVENOUS
Status: DISCONTINUED | OUTPATIENT
Start: 2020-11-02 | End: 2020-11-02 | Stop reason: HOSPADM

## 2020-11-02 RX ORDER — SODIUM CHLORIDE, SODIUM LACTATE, POTASSIUM CHLORIDE, CALCIUM CHLORIDE 600; 310; 30; 20 MG/100ML; MG/100ML; MG/100ML; MG/100ML
INJECTION, SOLUTION INTRAVENOUS CONTINUOUS PRN
Status: DISCONTINUED | OUTPATIENT
Start: 2020-11-02 | End: 2020-11-02 | Stop reason: SDUPTHER

## 2020-11-02 RX ORDER — ACETAMINOPHEN 650 MG/1
650 SUPPOSITORY RECTAL EVERY 6 HOURS PRN
Status: DISCONTINUED | OUTPATIENT
Start: 2020-11-02 | End: 2020-11-03 | Stop reason: HOSPADM

## 2020-11-02 RX ORDER — LABETALOL HYDROCHLORIDE 5 MG/ML
5 INJECTION, SOLUTION INTRAVENOUS EVERY 10 MIN PRN
Status: DISCONTINUED | OUTPATIENT
Start: 2020-11-02 | End: 2020-11-02 | Stop reason: HOSPADM

## 2020-11-02 RX ORDER — FENTANYL CITRATE 50 UG/ML
INJECTION, SOLUTION INTRAMUSCULAR; INTRAVENOUS PRN
Status: DISCONTINUED | OUTPATIENT
Start: 2020-11-02 | End: 2020-11-02 | Stop reason: SDUPTHER

## 2020-11-02 RX ORDER — PIPERACILLIN SODIUM, TAZOBACTAM SODIUM 3; .375 G/15ML; G/15ML
INJECTION, POWDER, LYOPHILIZED, FOR SOLUTION INTRAVENOUS PRN
Status: DISCONTINUED | OUTPATIENT
Start: 2020-11-02 | End: 2020-11-02 | Stop reason: SDUPTHER

## 2020-11-02 RX ORDER — PROMETHAZINE HYDROCHLORIDE 25 MG/1
12.5 TABLET ORAL EVERY 6 HOURS PRN
Status: DISCONTINUED | OUTPATIENT
Start: 2020-11-02 | End: 2020-11-03 | Stop reason: HOSPADM

## 2020-11-02 RX ORDER — LABETALOL HYDROCHLORIDE 5 MG/ML
INJECTION, SOLUTION INTRAVENOUS PRN
Status: DISCONTINUED | OUTPATIENT
Start: 2020-11-02 | End: 2020-11-02 | Stop reason: SDUPTHER

## 2020-11-02 RX ORDER — DIPHENHYDRAMINE HYDROCHLORIDE 50 MG/ML
12.5 INJECTION INTRAMUSCULAR; INTRAVENOUS
Status: DISCONTINUED | OUTPATIENT
Start: 2020-11-02 | End: 2020-11-02 | Stop reason: HOSPADM

## 2020-11-02 RX ORDER — SODIUM CHLORIDE, SODIUM LACTATE, POTASSIUM CHLORIDE, CALCIUM CHLORIDE 600; 310; 30; 20 MG/100ML; MG/100ML; MG/100ML; MG/100ML
INJECTION, SOLUTION INTRAVENOUS ONCE
Status: COMPLETED | OUTPATIENT
Start: 2020-11-02 | End: 2020-11-02

## 2020-11-02 RX ORDER — HYDROCODONE BITARTRATE AND ACETAMINOPHEN 5; 325 MG/1; MG/1
1 TABLET ORAL EVERY 4 HOURS PRN
Status: DISCONTINUED | OUTPATIENT
Start: 2020-11-02 | End: 2020-11-03 | Stop reason: HOSPADM

## 2020-11-02 RX ORDER — MULTIVITAMIN WITH IRON
250 TABLET ORAL DAILY
Status: ON HOLD | COMMUNITY
End: 2021-01-03 | Stop reason: ALTCHOICE

## 2020-11-02 RX ORDER — ONDANSETRON 2 MG/ML
INJECTION INTRAMUSCULAR; INTRAVENOUS PRN
Status: DISCONTINUED | OUTPATIENT
Start: 2020-11-02 | End: 2020-11-02 | Stop reason: SDUPTHER

## 2020-11-02 RX ORDER — DEXAMETHASONE SODIUM PHOSPHATE 4 MG/ML
INJECTION, SOLUTION INTRA-ARTICULAR; INTRALESIONAL; INTRAMUSCULAR; INTRAVENOUS; SOFT TISSUE PRN
Status: DISCONTINUED | OUTPATIENT
Start: 2020-11-02 | End: 2020-11-02 | Stop reason: SDUPTHER

## 2020-11-02 RX ORDER — PHENOL 1.4 %
1 AEROSOL, SPRAY (ML) MUCOUS MEMBRANE DAILY
Status: ON HOLD | COMMUNITY
End: 2021-01-03 | Stop reason: ALTCHOICE

## 2020-11-02 RX ORDER — PROMETHAZINE HYDROCHLORIDE 25 MG/ML
6.25 INJECTION, SOLUTION INTRAMUSCULAR; INTRAVENOUS
Status: DISCONTINUED | OUTPATIENT
Start: 2020-11-02 | End: 2020-11-02 | Stop reason: HOSPADM

## 2020-11-02 RX ORDER — LIDOCAINE HYDROCHLORIDE 20 MG/ML
INJECTION, SOLUTION INTRAVENOUS PRN
Status: DISCONTINUED | OUTPATIENT
Start: 2020-11-02 | End: 2020-11-02 | Stop reason: SDUPTHER

## 2020-11-02 RX ORDER — ONDANSETRON 2 MG/ML
4 INJECTION INTRAMUSCULAR; INTRAVENOUS ONCE
Status: COMPLETED | OUTPATIENT
Start: 2020-11-02 | End: 2020-11-02

## 2020-11-02 RX ORDER — SODIUM CHLORIDE 0.9 % (FLUSH) 0.9 %
10 SYRINGE (ML) INJECTION EVERY 12 HOURS SCHEDULED
Status: DISCONTINUED | OUTPATIENT
Start: 2020-11-02 | End: 2020-11-03 | Stop reason: HOSPADM

## 2020-11-02 RX ORDER — ROCURONIUM BROMIDE 10 MG/ML
INJECTION, SOLUTION INTRAVENOUS PRN
Status: DISCONTINUED | OUTPATIENT
Start: 2020-11-02 | End: 2020-11-02 | Stop reason: SDUPTHER

## 2020-11-02 RX ORDER — HYDROMORPHONE HCL 110MG/55ML
0.5 PATIENT CONTROLLED ANALGESIA SYRINGE INTRAVENOUS EVERY 5 MIN PRN
Status: DISCONTINUED | OUTPATIENT
Start: 2020-11-02 | End: 2020-11-02 | Stop reason: HOSPADM

## 2020-11-02 RX ORDER — CEFAZOLIN SODIUM 2 G/50ML
SOLUTION INTRAVENOUS PRN
Status: DISCONTINUED | OUTPATIENT
Start: 2020-11-02 | End: 2020-11-02 | Stop reason: SDUPTHER

## 2020-11-02 RX ORDER — 0.9 % SODIUM CHLORIDE 0.9 %
1000 INTRAVENOUS SOLUTION INTRAVENOUS ONCE
Status: COMPLETED | OUTPATIENT
Start: 2020-11-02 | End: 2020-11-02

## 2020-11-02 RX ORDER — MORPHINE SULFATE 2 MG/ML
2 INJECTION, SOLUTION INTRAMUSCULAR; INTRAVENOUS
Status: DISCONTINUED | OUTPATIENT
Start: 2020-11-02 | End: 2020-11-02 | Stop reason: ALTCHOICE

## 2020-11-02 RX ORDER — ACETAMINOPHEN 325 MG/1
650 TABLET ORAL EVERY 6 HOURS PRN
Status: DISCONTINUED | OUTPATIENT
Start: 2020-11-02 | End: 2020-11-03 | Stop reason: HOSPADM

## 2020-11-02 RX ORDER — SODIUM CHLORIDE 9 MG/ML
INJECTION, SOLUTION INTRAVENOUS CONTINUOUS
Status: DISCONTINUED | OUTPATIENT
Start: 2020-11-02 | End: 2020-11-02 | Stop reason: ALTCHOICE

## 2020-11-02 RX ORDER — HYDROCODONE BITARTRATE AND ACETAMINOPHEN 5; 325 MG/1; MG/1
2 TABLET ORAL PRN
Status: DISCONTINUED | OUTPATIENT
Start: 2020-11-02 | End: 2020-11-02 | Stop reason: HOSPADM

## 2020-11-02 RX ORDER — SODIUM CHLORIDE 0.9 % (FLUSH) 0.9 %
10 SYRINGE (ML) INJECTION PRN
Status: DISCONTINUED | OUTPATIENT
Start: 2020-11-02 | End: 2020-11-03 | Stop reason: HOSPADM

## 2020-11-02 RX ORDER — PROPOFOL 10 MG/ML
INJECTION, EMULSION INTRAVENOUS PRN
Status: DISCONTINUED | OUTPATIENT
Start: 2020-11-02 | End: 2020-11-02 | Stop reason: SDUPTHER

## 2020-11-02 RX ORDER — FENTANYL CITRATE 50 UG/ML
50 INJECTION, SOLUTION INTRAMUSCULAR; INTRAVENOUS EVERY 5 MIN PRN
Status: DISCONTINUED | OUTPATIENT
Start: 2020-11-02 | End: 2020-11-02 | Stop reason: HOSPADM

## 2020-11-02 RX ADMIN — PROPOFOL 200 MG: 10 INJECTION, EMULSION INTRAVENOUS at 13:14

## 2020-11-02 RX ADMIN — ONDANSETRON 4 MG: 2 INJECTION INTRAMUSCULAR; INTRAVENOUS at 04:18

## 2020-11-02 RX ADMIN — SODIUM CHLORIDE 1000 ML: 9 INJECTION, SOLUTION INTRAVENOUS at 04:17

## 2020-11-02 RX ADMIN — ROCURONIUM BROMIDE 50 MG: 10 INJECTION INTRAVENOUS at 13:15

## 2020-11-02 RX ADMIN — SODIUM CHLORIDE: 9 INJECTION, SOLUTION INTRAVENOUS at 17:00

## 2020-11-02 RX ADMIN — SODIUM CHLORIDE, POTASSIUM CHLORIDE, SODIUM LACTATE AND CALCIUM CHLORIDE: 600; 310; 30; 20 INJECTION, SOLUTION INTRAVENOUS at 14:40

## 2020-11-02 RX ADMIN — HYDROCODONE BITARTRATE AND ACETAMINOPHEN 1 TABLET: 5; 325 TABLET ORAL at 17:00

## 2020-11-02 RX ADMIN — LIDOCAINE HYDROCHLORIDE 100 MG: 20 INJECTION, SOLUTION INTRAVENOUS at 13:14

## 2020-11-02 RX ADMIN — PIPERACILLIN AND TAZOBACTAM 4.5 G: 3; .375 INJECTION, POWDER, LYOPHILIZED, FOR SOLUTION INTRAVENOUS at 13:27

## 2020-11-02 RX ADMIN — LABETALOL HYDROCHLORIDE 2.5 MG: 5 INJECTION, SOLUTION INTRAVENOUS at 13:51

## 2020-11-02 RX ADMIN — IOPAMIDOL 75 ML: 755 INJECTION, SOLUTION INTRAVENOUS at 05:14

## 2020-11-02 RX ADMIN — ESMOLOL HYDROCHLORIDE 20 MG: 10 INJECTION, SOLUTION INTRAVENOUS at 13:39

## 2020-11-02 RX ADMIN — LABETALOL HYDROCHLORIDE 2.5 MG: 5 INJECTION, SOLUTION INTRAVENOUS at 14:17

## 2020-11-02 RX ADMIN — HYDROMORPHONE HYDROCHLORIDE 0.5 MG: 2 INJECTION, SOLUTION INTRAMUSCULAR; INTRAVENOUS; SUBCUTANEOUS at 10:41

## 2020-11-02 RX ADMIN — HYDROMORPHONE HYDROCHLORIDE 0.5 MG: 2 INJECTION INTRAMUSCULAR; INTRAVENOUS; SUBCUTANEOUS at 04:18

## 2020-11-02 RX ADMIN — ROCURONIUM BROMIDE 10 MG: 10 INJECTION INTRAVENOUS at 14:02

## 2020-11-02 RX ADMIN — FENTANYL CITRATE 100 MCG: 50 INJECTION INTRAMUSCULAR; INTRAVENOUS at 13:14

## 2020-11-02 RX ADMIN — DEXAMETHASONE SODIUM PHOSPHATE 4 MG: 4 INJECTION, SOLUTION INTRAMUSCULAR; INTRAVENOUS at 13:22

## 2020-11-02 RX ADMIN — SUGAMMADEX 200 MG: 100 INJECTION, SOLUTION INTRAVENOUS at 14:32

## 2020-11-02 RX ADMIN — SODIUM CHLORIDE, POTASSIUM CHLORIDE, SODIUM LACTATE AND CALCIUM CHLORIDE: 600; 310; 30; 20 INJECTION, SOLUTION INTRAVENOUS at 12:39

## 2020-11-02 RX ADMIN — CEFAZOLIN SODIUM 3 G: 2 SOLUTION INTRAVENOUS at 13:34

## 2020-11-02 RX ADMIN — ONDANSETRON 4 MG: 2 INJECTION INTRAMUSCULAR; INTRAVENOUS at 14:28

## 2020-11-02 RX ADMIN — PIPERACILLIN AND TAZOBACTAM 3.38 G: 3; .375 INJECTION, POWDER, LYOPHILIZED, FOR SOLUTION INTRAVENOUS at 23:51

## 2020-11-02 RX ADMIN — FENTANYL CITRATE 50 MCG: 50 INJECTION INTRAMUSCULAR; INTRAVENOUS at 15:15

## 2020-11-02 RX ADMIN — SODIUM CHLORIDE, POTASSIUM CHLORIDE, SODIUM LACTATE AND CALCIUM CHLORIDE: 600; 310; 30; 20 INJECTION, SOLUTION INTRAVENOUS at 13:14

## 2020-11-02 RX ADMIN — PIPERACILLIN SODIUM, TAZOBACTAM SODIUM 4.5 G: 4; .5 INJECTION, POWDER, LYOPHILIZED, FOR SOLUTION INTRAVENOUS at 08:41

## 2020-11-02 RX ADMIN — ONDANSETRON 4 MG: 2 INJECTION INTRAMUSCULAR; INTRAVENOUS at 13:22

## 2020-11-02 ASSESSMENT — PULMONARY FUNCTION TESTS
PIF_VALUE: 6
PIF_VALUE: 20
PIF_VALUE: 17
PIF_VALUE: 29
PIF_VALUE: 19
PIF_VALUE: 28
PIF_VALUE: 28
PIF_VALUE: 19
PIF_VALUE: 19
PIF_VALUE: 27
PIF_VALUE: 30
PIF_VALUE: 27
PIF_VALUE: 31
PIF_VALUE: 26
PIF_VALUE: 6
PIF_VALUE: 19
PIF_VALUE: 0
PIF_VALUE: 29
PIF_VALUE: 19
PIF_VALUE: 17
PIF_VALUE: 0
PIF_VALUE: 30
PIF_VALUE: 0
PIF_VALUE: 19
PIF_VALUE: 27
PIF_VALUE: 27
PIF_VALUE: 0
PIF_VALUE: 29
PIF_VALUE: 19
PIF_VALUE: 17
PIF_VALUE: 27
PIF_VALUE: 19
PIF_VALUE: 20
PIF_VALUE: 27
PIF_VALUE: 27
PIF_VALUE: 28
PIF_VALUE: 26
PIF_VALUE: 27
PIF_VALUE: 19
PIF_VALUE: 20
PIF_VALUE: 19
PIF_VALUE: 0
PIF_VALUE: 30
PIF_VALUE: 26
PIF_VALUE: 25
PIF_VALUE: 2
PIF_VALUE: 27
PIF_VALUE: 5
PIF_VALUE: 19
PIF_VALUE: 28
PIF_VALUE: 17
PIF_VALUE: 1
PIF_VALUE: 30
PIF_VALUE: 26
PIF_VALUE: 28
PIF_VALUE: 19
PIF_VALUE: 18
PIF_VALUE: 30
PIF_VALUE: 18
PIF_VALUE: 19
PIF_VALUE: 27
PIF_VALUE: 26
PIF_VALUE: 28
PIF_VALUE: 0
PIF_VALUE: 18
PIF_VALUE: 29
PIF_VALUE: 18
PIF_VALUE: 19
PIF_VALUE: 28
PIF_VALUE: 34
PIF_VALUE: 18
PIF_VALUE: 19
PIF_VALUE: 11
PIF_VALUE: 25
PIF_VALUE: 0
PIF_VALUE: 23
PIF_VALUE: 3
PIF_VALUE: 28
PIF_VALUE: 19
PIF_VALUE: 27
PIF_VALUE: 28
PIF_VALUE: 9
PIF_VALUE: 26
PIF_VALUE: 28
PIF_VALUE: 29
PIF_VALUE: 6
PIF_VALUE: 26
PIF_VALUE: 19
PIF_VALUE: 17
PIF_VALUE: 27
PIF_VALUE: 0
PIF_VALUE: 27
PIF_VALUE: 26
PIF_VALUE: 1
PIF_VALUE: 0

## 2020-11-02 ASSESSMENT — PAIN DESCRIPTION - LOCATION
LOCATION: ABDOMEN

## 2020-11-02 ASSESSMENT — PAIN DESCRIPTION - DESCRIPTORS
DESCRIPTORS: ACHING
DESCRIPTORS: DISCOMFORT

## 2020-11-02 ASSESSMENT — ENCOUNTER SYMPTOMS
ALLERGIC/IMMUNOLOGIC NEGATIVE: 1
CONSTIPATION: 0
RESPIRATORY NEGATIVE: 1
ABDOMINAL PAIN: 1
SHORTNESS OF BREATH: 1
EYES NEGATIVE: 1
DIARRHEA: 0
VOMITING: 1
NAUSEA: 1

## 2020-11-02 ASSESSMENT — PAIN SCALES - GENERAL
PAINLEVEL_OUTOF10: 6
PAINLEVEL_OUTOF10: 1
PAINLEVEL_OUTOF10: 7
PAINLEVEL_OUTOF10: 3
PAINLEVEL_OUTOF10: 4

## 2020-11-02 ASSESSMENT — PAIN DESCRIPTION - PAIN TYPE
TYPE: SURGICAL PAIN
TYPE: SURGICAL PAIN
TYPE: ACUTE PAIN

## 2020-11-02 ASSESSMENT — PAIN DESCRIPTION - FREQUENCY
FREQUENCY: CONTINUOUS
FREQUENCY: CONTINUOUS

## 2020-11-02 NOTE — OP NOTE
Operative Note    Patient ID:    Kofi Pastor  [id number]  85 y.o.  1955      Indications: The above patient presented with  symptomatic gallbladder disease and was worked up appropriately--including imaging and history and physical exam. After discussion with the patient, the decision was made to undergo laparoscopic, possible open, cholecystectomy with the possibility of intraoperative cholangiogram.    Pre-Operative Diagnosis: Symptomatic cholelithiasis     Post-Operative Diagnosis:  Acute on chronic calculous cholecystitis     Procedure:   Laparoscopic Cholecystectomy     Surgeon: Vikram Wayne MD    First Assistant: Rodney Doherty CNP The skilled assistance of the CNP was necessary for the successful completion of this case. She was essential for the proper positioning, manipulation of instruments, proper exposure, manipulation of tissue, and wound closure. Anesthesia: General endotracheal anesthesia    ASA: 3    Findings: Consistent with post-operative diagnosis    Estimated Blood Loss:  50 mL           Drains:  None           Total IV Fluids: 1000 mL           Specimens: Gallbladder and contents          Complications:  None; patient tolerated the procedure well. Disposition: PACU - hemodynamically stable. Condition: stable        Opeartive Details: The patient was met in the pre-operative holding area. An informed consent was obtained after discussing the risks, benefits, complications, treatment options, and expected outcomes. The risks discussed included: adverse reaction to medication, pulmonary aspiration, perforation of viscus, bleeding, recurrent infection, finding a normal gallbladder, the need for additional procedures, failure to diagnose a condition, the possible need to convert to an open procedure, damage to nearby structures (specifically biliary structures) and creating a complication requiring transfusion or separate operation.      The patient and/or family concurred with the proposed plan, giving informed consent. The patient was transported to the operating room. Once in the operating room, the patient was transferred onto the operating room table and placed in the supine position. The patient was secured to the operating room table with multiple straps. All pressure points were padded appropriately. General anesthesia was induced and tolerated without incident. The patient's abdomen was prepped and draped in the standard, sterile fashion. Antibiotic prophylaxis was administered in accordance with national protocol. A time-out was held with all members of the operating room team present and in agreement. Local anesthetic was injected into the skin of the left upper quadrant and an incision was made using an 11 blade scalpel. Using a 5 mm optical trocar, the peritoneum was entered without incidence or damage to nearby structures. Pneumoperitoneum was established to 15 mm Hg with CO2 and tolerated well without any adverse changes noted. Three additional trocars were introduced under direct vision. All skin incisions were infiltrated with local anesthetic prior to making the incisions and placing the trocars. The patient was then positioned in reverse trendelenburg with the right side up. The gallbladder was identified, the fundus grasped, and retracted cephalad. The gallbladder was distended, inflamed, and had adhesions. Adhesions were taken down with a combination of blunt dissection and with electrocautery, taking care not to injure any adjacent organs or viscus. The gallbladder was drained with a laparoscopic needle. The bile was lighter than normal but not totally clear. The infundibulum was grasped and retracted laterally, exposing the peritoneum overlying the triangle of Calot. This peritoneum was divided and exposed in a blunt fashion. The cystic duct was clearly identified and bluntly dissected circumferentially.  The junctions

## 2020-11-02 NOTE — PROGRESS NOTES
[facility] called this nurse, requesting staff to not relay any medical information to pts. Alvaro Thompson. Pt. Agreed, states okay to give information to [facility] only.

## 2020-11-02 NOTE — ED NOTES
Report called to Geremias Reyes floor nurse, all questions answered      Alessandra Purdy, CIPRIANO  11/02/20 [id number]

## 2020-11-02 NOTE — PROGRESS NOTES
Medication History  [facility]    Patient Name: Kip Waldrop 1955     Medication history has been completed by: Swapna Hussein CPhT    Source(s) of information: patient's partner and retail pharmacy    Primary Care Physician: Trisha Khan MD     Pharmacy: [facility]    Allergies as of 11/02/2020 - Review Complete 11/02/2020   Allergen Reaction Noted    Prednisone  03/19/2012    Ciclopirox olamine  02/26/2019        Prior to Admission medications    Medication Sig Start Date End Date Taking? Authorizing Provider   fluticasone-salmeterol (ADVAIR) 250-50 MCG/DOSE AEPB Inhale 1 puff into the lungs every 12 hours   Yes Historical Provider, MD   Multiple Vitamins-Minerals (CENTRUM SILVER 50+MEN) TABS Take 1 tablet by mouth daily   Yes Historical Provider, MD   calcium carbonate 600 MG TABS tablet Take 1 tablet by mouth daily   Yes Historical Provider, MD   magnesium (MAGNESIUM-OXIDE) 250 MG TABS tablet Take 250 mg by mouth daily   Yes Historical Provider, MD   albuterol sulfate HFA (PROAIR HFA) 108 (90 Base) MCG/ACT inhaler Inhale 2 puffs into the lungs every 6 hours as needed for Wheezing   Yes Historical Provider, MD   montelukast (SINGULAIR) 10 MG tablet Take 1 tablet by mouth daily 5/9/16  Yes Silvino Ferguson PA-C     Medications added or changed (ex. new medication, dosage change, interval change, formulation change):  Breo changed to Advair  MVI (added)  Magnesium (added)  Calcium (added)    Medications removed from list (include reason, ex. noncompliance, medication cost, therapy complete etc.):   Flonase patient no longer using  Meloxicam no longer taking  Xopenex no longer using  IBU patient no longer prescription strength    Comments:  Medication list reviewed with patient's partner via phone and retail pharmacy. Per partner patient only took tums and used inhaler piror to ER visit.     To my knowledge the above medication history is accurate as of 11/2/2020 11:58 AM.   Shaka Hsu Ciaran Yo   11/2/2020 11:58 AM

## 2020-11-02 NOTE — H&P
History and Physical      Name:  Marco Lynch /Age/Sex: 1955  (59 y.o. male)   MRN & CSN:  [id number] & [id number] Admission Date/Time: 2020  3:23 AM   Location:  [facility] PCP: Beronica Johnson MD       Hospital Day: 1        Admitting Physician: Dr. Martines Govern and Plan:   Marco Lynch is a 59 y.o. male who presents with Abdominal Pain; Emesis; and Shortness of Breath     Abdominal pain suspected cholecystitis    Admit to Med/Surg   PRN pain control   NPO   Zosyn    General surgery consult    Plan to take to OR today       Essential hypertension- continue home antihypertensive regimen- Monitor BP trends. Patient case discussed with ED provider    Diet No diet orders on file   DVT Prophylaxis [x] Lovenox, []  Heparin, [] SCDs, [] Ambulation  [] Long term AC   GI Prophylaxis [x] PPI,  [x] H2 Blocker,  [] Carafate,  [] Diet/Tube Feeds   Code Status Full     Disposition Admit to inpatient . Patient plans to return home upon discharge   MDM [] Low, [] Moderate,[]  High     -Patient assessment and plan discussed and reviewed with admitting physician: Ismael Aguilera MD.     History of Present Illness:     Chief Complaint: Abdominal Pain; Emesis; and Shortness of Breath    Marco Lynch is a 59 y.o. male who presents with abdominal pain. Reports onset acutely overnight. Describes RUQ sharp stabbing pain. Currently controlled 2/10 with pain control provided in Ed. He has associated SOB, nausea and vomiting. He denies any precipitating factors. Ten point ROS: reviewed negative, unless as noted in above HPI. Objective:        Intake/Output Summary (Last 24 hours) at 2020 1117  Last data filed at 2020 1047  Gross per 24 hour   Intake 1050 ml   Output 550 ml   Net 500 ml        Vitals:   Vitals:    20 0332 20 0333   BP: (!) 146/83 (!) 146/83   Pulse: 78 79   Resp: (!) 39 18   Temp:  97.4 °F (36.3 °C)   TempSrc:  Oral   SpO2: 95% 95%   Weight:  240 lb (108.9 kg)   Height:  5' 10\" (1.778 m)       Physical Exam: 11/02/20     GEN -Awake nontoxic appearing male, sitting upright in bed , NAD. Normal body habitus. Appears given age. EYES -PERRLA. No scleral erythema, discharge, or conjunctivitis. HENT -MM are moist. Oral pharynx without exudates, no evidence of thrush. NECK -Supple, no apparent thyromegaly or masses. RESP -CTA, no wheezes, rales or rhonchi. Symmetric chest movement while on RA.   C/V -S1/S2 auscultated. RRR without appreciable M/R/G. No JVD or carotid bruits. Peripheral pulses equal bilaterally and palpable. Cap refill <3 sec. No peripheral edema. GI -Abdomen is soft non distended and without significant TTP. + BS. No masses or guarding. Rectal exam deferred. No HSM   -No CVA/ flank tenderness. Guerra catheter is not present. LYMPH-No palpable cervical lymphadenopathy and no hepatosplenomegaly. No petechiae or ecchymoses. MS -No gross joint deformities. SKIN -Normal coloration, warm, dry. NEURO-Cranial nerves appear grossly intact, normal speech, no lateralizing weakness. PSYC-Awake, alert, oriented x 4- person, place, time, situation,  Appropriate affect. Past Medical History:      Past Medical History:   Diagnosis Date    Asthma     Chronic back pain     echocardiogram 06/10/2020    EF 55-60% mild MR TR    Family history of coronary artery disease     Hearing loss     History of Doppler ultrasound 07/20/2018    No significant venous insufficiency, No DVT or SVT    History of echocardiogram 07/20/2018    EF 55-60%, No significant valvular disease, no pericardial effusion, Mild PHTN at 42 mmHg.  History of left heart catheterization 08/09/2018    NL.  History of nuclear stress test 07/11/2018    EF 62%    Hypertension     SOB (shortness of breath)        Past Surgical  History:    has a past surgical history that includes Foot surgery; hernia repair; Colonoscopy (2005); and Dental surgery.     Social History:    FAM HX: Reviewed  family history includes Colon Cancer in his brother; Heart Disease in his father; High Blood Pressure in his mother; Osteoporosis in his mother. Soc HX:   Social History     Socioeconomic History    Marital status:      Spouse name: None    Number of children: None    Years of education: None    Highest education level: None   Occupational History    None   Social Needs    Financial resource strain: None    Food insecurity     Worry: None     Inability: None    Transportation needs     Medical: None     Non-medical: None   Tobacco Use    Smoking status: Former Smoker    Smokeless tobacco: Never Used    Tobacco comment: See Soft Chart    Substance and Sexual Activity    Alcohol use: No     Comment: Occasionally    Drug use: No    Sexual activity: Yes     Partners: Female   Lifestyle    Physical activity     Days per week: None     Minutes per session: None    Stress: None   Relationships    Social connections     Talks on phone: None     Gets together: None     Attends Sikhism service: None     Active member of club or organization: None     Attends meetings of clubs or organizations: None     Relationship status: None    Intimate partner violence     Fear of current or ex partner: None     Emotionally abused: None     Physically abused: None     Forced sexual activity: None   Other Topics Concern    None   Social History Narrative    None     TOBACCO:   reports that he has quit smoking. He has never used smokeless tobacco.  ETOH:   reports no history of alcohol use. Drugs:  reports no history of drug use. Allergies: Allergies   Allergen Reactions    Prednisone     Ciclopirox Olamine      Blister really bad        Home Medications:     Prior to Admission medications    Medication Sig Start Date End Date Taking?  Authorizing Provider   ibuprofen (ADVIL;MOTRIN) 600 MG tablet Take 1 tablet by mouth every 6 hours as needed for Pain 9/4/18   Marium [address], [place] meloxicam (MOBIC) 15 MG tablet Take 15 mg by mouth daily    Historical Provider, MD   albuterol sulfate HFA (PROAIR HFA) 108 (90 Base) MCG/ACT inhaler Inhale 2 puffs into the lungs every 6 hours as needed for Wheezing    Historical Provider, MD   fluticasone [facility]) 50 MCG/ACT nasal spray  4/14/18   Historical Provider, MD   Fluticasone Furoate-Vilanterol (BREO ELLIPTA) 200-25 MCG/INH AEPB Inhale into the lungs    Historical Provider, MD   levalbuterol [place] [facility]) 0.31 MG/3ML nebulization Take 3 mLs by nebulization every 4 hours as needed for Wheezing or Shortness of Breath 4/6/17   Jenae Emmanuel MD   montelukast (SINGULAIR) 10 MG tablet Take 1 tablet by mouth daily 5/9/16   [facility] AZUCENA FergusonC         Medications:   Medications:    sodium chloride flush  10 mL Intravenous BID      Infusions:   PRN Meds: fentanNYL, 50 mcg, Q5 Min PRN  HYDROmorphone, 0.5 mg, Q5 Min PRN  HYDROcodone 5 mg - acetaminophen, 1 tablet, PRN    Or  HYDROcodone 5 mg - acetaminophen, 2 tablet, PRN  diphenhydrAMINE, 12.5 mg, Once PRN  sodium chloride, 500 mL, Once PRN  ondansetron, 4 mg, Once PRN  promethazine, 6.25 mg, Once PRN  labetalol, 5 mg, Q10 Min PRN  hydrALAZINE, 5 mg, Q10 Min PRN  meperidine, 12.5 mg, Q5 Min PRN        Data:     Laboratory this visit:  Reviewed  Recent Labs     11/02/20  0356   WBC 15.4*   HGB 16.0   HCT 46.4         Recent Labs     11/02/20  0356      K 3.9      CO2 23   BUN 18   CREATININE 0.9     Recent Labs     11/02/20  0356   AST 13*   ALT 14   BILIDIR 0.2   BILITOT 0.4   ALKPHOS 82     No results for input(s): INR in the last 72 hours. Radiology this visit:  Reviewed.     Ct Abdomen Pelvis W Iv Contrast Additional Contrast? None    Result Date: 11/2/2020  EXAMINATION: CT OF THE ABDOMEN AND PELVIS WITH CONTRAST 11/2/2020 5:14 am TECHNIQUE: CT of the abdomen and pelvis was performed with the administration of intravenous contrast. Multiplanar reformatted images are provided for review. Dose modulation, iterative reconstruction, and/or weight based adjustment of the mA/kV was utilized to reduce the radiation dose to as low as reasonably achievable. COMPARISON: Chest CT from 09/04/2018 HISTORY: ORDERING SYSTEM PROVIDED HISTORY: please evaluate for diffuse abdominal pain greater on the right side TECHNOLOGIST PROVIDED HISTORY: Reason for exam:->please evaluate for diffuse abdominal pain greater on the right side Additional Contrast?->None Reason for Exam: midline upper abd pain FINDINGS: Lower Chest: There is bibasilar atelectasis. Organs: The liver, spleen, pancreas, gallbladder and right adrenal gland are unremarkable. Tiny left adrenal gland nodule is unchanged, most likely an adenoma. Bilateral renal cysts are again noted. GI/Bowel: Small bowel caliber is normal.  The appendix is normal.  Sigmoid colon diverticula are present without evidence for diverticulitis. Pelvis: The bladder is grossly negative. The prostate gland is enlarged. Peritoneum/Retroperitoneum: No adenopathy, mesenteric stranding or free fluid. Aortic caliber is normal. Bones/Soft Tissues: Unremarkable. Diverticulosis without scan evidence for diverticulitis or other acute process. Xr Chest 1 View    Result Date: 11/2/2020  EXAMINATION: ONE XRAY VIEW OF THE CHEST 11/2/2020 10:12 am COMPARISON: 09/04/2018. HISTORY: ORDERING SYSTEM PROVIDED HISTORY: elevated WBC, concern for infection TECHNOLOGIST PROVIDED HISTORY: Reason for exam:->elevated WBC, concern for infection Reason for Exam: abd pain   sob FINDINGS: The heart size is within normal limits. The pulmonary vasculature is also within normal limits. No acute infiltrates are seen. The costophrenic angles are sharp bilaterally. No pneumothoraces are noted. 1. No active pulmonary disease. Us Abdomen Limited    Result Date: 11/2/2020  EXAMINATION: RIGHT UPPER QUADRANT ULTRASOUND 11/2/2020 5:34 am COMPARISON: None.  HISTORY: ORDERING SYSTEM PROVIDED

## 2020-11-02 NOTE — ED NOTES
Bed: ED-16  Expected date:   Expected time:   Means of arrival:   Comments:  mariann Ballesteros RN  11/02/20 5781

## 2020-11-02 NOTE — CONSULTS
Department of General Surgery   Surgical Service Dr. Dedrick San   Consult Note    Date of Consult: 11/2/20    Reason for Consult: symptomatic cholelithiasis    Requesting Physician:  ED    CHIEF COMPLAINT:  RUQ pain    History Obtained From: electronic medical record, patient    HISTORY OF PRESENT ILLNESS:      The patient is a 59 y.o. male who presents with RUQ as described below. Location: RUQ  Quality: sharp, achy  Severity: 9 /10 at worst. Currently 5/10  Duration: 12 hours  Timing: last night at 1am  Context: denies  Modifying factors: pain medications have made it better  Associated signs and symptoms: nausea and vomiting    Emelina Corraels is a 60 yo male that presented to the ER last night with RUQ pain. Patient states that he has never had anything like this happen before. He stated that the pain woke him up last night and decided to come to the ER. Patient was worked up in the ER with labs, imaging, and physical exam. All reviewed. Patient was admitted to the hospitalist and a consult for general surgery was placed. Past Medical History:    Past Medical History:   Diagnosis Date    Asthma     Chronic back pain     echocardiogram 06/10/2020    EF 55-60% mild MR TR    Family history of coronary artery disease     Hearing loss     History of Doppler ultrasound 07/20/2018    No significant venous insufficiency, No DVT or SVT    History of echocardiogram 07/20/2018    EF 55-60%, No significant valvular disease, no pericardial effusion, Mild PHTN at 42 mmHg.  History of left heart catheterization 08/09/2018    NL.     History of nuclear stress test 07/11/2018    EF 62%    Hypertension     SOB (shortness of breath)        Past Surgical History:    Past Surgical History:   Procedure Laterality Date    COLONOSCOPY  2005    polyp removed Dr Bigg Mares         Current Medications:   Current Facility-Administered Medications   Medication Dose Active member of club or organization: None     Attends meetings of clubs or organizations: None     Relationship status: None    Intimate partner violence     Fear of current or ex partner: None     Emotionally abused: None     Physically abused: None     Forced sexual activity: None   Other Topics Concern    None   Social History Narrative    None       Family History:   Family History   Problem Relation Age of Onset    High Blood Pressure Mother     Osteoporosis Mother     Heart Disease Father     Colon Cancer Brother        REVIEW OFSYSTEMS:    Review of Systems   Constitutional: Negative. HENT: Negative. Eyes: Negative. Respiratory: Negative. Cardiovascular: Negative. Gastrointestinal: Positive for abdominal pain, nausea and vomiting. Negative for constipation and diarrhea. RUQ   Endocrine: Negative. Genitourinary: Negative. Musculoskeletal: Negative. Skin: Negative. Allergic/Immunologic: Negative. Neurological: Negative. Hematological: Negative. Psychiatric/Behavioral: Negative. PHYSICAL EXAM:  Vitals:    11/02/20 0332 11/02/20 0333   BP: (!) 146/83 (!) 146/83   Pulse: 78 79   Resp: (!) 39 18   Temp:  97.4 °F (36.3 °C)   TempSrc:  Oral   SpO2: 95% 95%   Weight:  240 lb (108.9 kg)   Height:  5' 10\" (1.778 m)       Physical Exam  Vitals signs and nursing note reviewed. Constitutional:       Appearance: Normal appearance. HENT:      Head: Normocephalic and atraumatic. Right Ear: External ear normal.      Left Ear: External ear normal.      Nose: Nose normal.      Mouth/Throat:      Mouth: Mucous membranes are dry. Eyes:      Extraocular Movements: Extraocular movements intact. Pupils: Pupils are equal, round, and reactive to light. Neck:      Musculoskeletal: Normal range of motion and neck supple. Cardiovascular:      Rate and Rhythm: Normal rate and regular rhythm.    Pulmonary:      Effort: Pulmonary effort is normal.      Breath sounds: Normal breath sounds. Abdominal:      Palpations: Abdomen is soft. Tenderness: There is abdominal tenderness. Comments: RUQ   Musculoskeletal: Normal range of motion. Skin:     General: Skin is warm and dry. Neurological:      General: No focal deficit present. Mental Status: He is alert and oriented to person, place, and time. Mental status is at baseline. Psychiatric:         Mood and Affect: Mood normal.         Behavior: Behavior normal.       DATA:    CBC:   Lab Results   Component Value Date    WBC 15.4 11/02/2020    RBC 5.43 11/02/2020    HGB 16.0 11/02/2020    HCT 46.4 11/02/2020    MCV 85.5 11/02/2020    MCH 29.5 11/02/2020    MCHC 34.5 11/02/2020    RDW 13.8 11/02/2020     11/02/2020    MPV 9.7 11/02/2020     CMP:    Lab Results   Component Value Date     11/02/2020    K 3.9 11/02/2020     11/02/2020    CO2 23 11/02/2020    BUN 18 11/02/2020    CREATININE 0.9 11/02/2020    GFRAA >60 11/02/2020    LABGLOM >60 11/02/2020    GLUCOSE 154 11/02/2020    PROT 6.9 11/02/2020    PROT 6.2 03/19/2012    LABALBU 4.1 11/02/2020    CALCIUM 9.2 11/02/2020    BILITOT 0.4 11/02/2020    ALKPHOS 82 11/02/2020    AST 13 11/02/2020    ALT 14 11/02/2020       IMPRESSION:    CT A/P  Impression    Diverticulosis without scan evidence for diverticulitis or other acute    process.                US abdomen:  Impression    1. Cholelithiasis without sonographic evidence for acute cholecystitis. 2. Fatty liver versus diffuse hepatocellular disease.     3. Nonvisualization of the pancreas     Patient Active Problem List:     Essential hypertension     Primary osteoarthritis     Mild intermittent asthma without complication     Benign prostatic hyperplasia without lower urinary tract symptoms     Environmental allergies     COPD (chronic obstructive pulmonary disease) (HCC)     Obesity (BMI 30.0-34.9)     GLADYS (obstructive sleep apnea)     Dyspnea on exertion     Unstable angina pectoris ([address]     Family history of coronary artery disease      Kendra Chung is a 60 yo male that presented to the ER with RUQ pain and was found to have cholelithiasis on ultrasound without sonographic evidence for acute cholecystitis    PLAN:    IVF  NPO  ATB- on zosyn  Will plan for laparoscopic cholecystectomy possible open today with Dr. Nadege Purcell  Consent obtained-- in chart  Rapid COVID ordered-- pending  All questions answered. The patient was counseled at length about the risks of no Covid-19 during their perioperative period and any recovery window from their procedure. The patient was made aware that no Covid-19  may worsen their prognosis for recovering from their procedure  and lend to a higher morbidity and/or mortality risk. All material risks, benefits, and reasonable alternatives including postponing the procedure were discussed. The patient does wish to proceed with the procedure at this time. JACOB Giles - CNP      Reviewed above. Agree. Changes made where appropriate. Acute cholecystitis. Will plan on lap anthony. Consent obtained. Reviewed in detail with the patient and/or family the expected pre-operative, operative, and post-operative courses including risks, benefits, and alternatives to the procedure.  The patient's questions were answered in detail and agreed to proceed with the procedure.   -NPO, IVF  -Pt on ABx  -Covid not detected  -Proceed to [place]

## 2020-11-02 NOTE — ED PROVIDER NOTES
Emergency Department Encounter    Patient: Peter Tay  MRN: [id number]  : 1955  Date of Evaluation: 2020  ED Provider:  Rosa Licea    Triage Chief Complaint:   Abdominal Pain; Emesis; and Shortness of Breath    [place]:  Peter Tay is a 59 y.o. male with past medical history of asthma, chronic back pain, hypertension, that presents with nausea and vomiting since early this morning x 4-5 hr.  Patient reports diffuse abdominal pain greater on the right upper side. Patient reports he has difficulty taking a deep breath secondary to pain. Patient denies any past history of acute cholecystitis/cholecystectomy. He denies any diarrhea. He denies any blood in emesis. No fever or chills. Is unaware of any alleviating factors. ROS - see HPI, below listed is current ROS at time of my eval:  General:  No fevers, no chills, no weakness  Eyes:  no discharge  ENT:  No sore throat, no nasal congestion  Cardiovascular:  No chest pain, no palpitations  Respiratory:  No shortness of breath, no cough, no wheezing  Gastrointestinal:  + pain, + nausea, + vomiting, no diarrhea  Musculoskeletal:  No muscle pain, no joint pain  Skin:  No rash, no pruritis  Neurologic:  no headache  Genitourinary:  No dysuria, no hematuria  Endocrine:  No unexpected weight gain, no unexpected weight loss  Extremities:  no edema, no pain    Past Medical History:   Diagnosis Date    Asthma     Chronic back pain     echocardiogram 06/10/2020    EF 55-60% mild MR TR    Family history of coronary artery disease     Hearing loss     History of Doppler ultrasound 2018    No significant venous insufficiency, No DVT or SVT    History of echocardiogram 2018    EF 55-60%, No significant valvular disease, no pericardial effusion, Mild PHTN at 42 mmHg.  History of left heart catheterization 2018    NL.     History of nuclear stress test 2018    EF 62%    Hypertension     SOB (shortness of breath) Past Surgical History:   Procedure Laterality Date    COLONOSCOPY  2005    polyp removed Dr Gabriel Mejía       Family History   Problem Relation Age of Onset    High Blood Pressure Mother     Osteoporosis Mother     Heart Disease Father     Colon Cancer Brother      Social History     Socioeconomic History    Marital status:      Spouse name: Not on file    Number of children: Not on file    Years of education: Not on file    Highest education level: Not on file   Occupational History    Not on file   Social Needs    Financial resource strain: Not on file    Food insecurity     Worry: Not on file     Inability: Not on file    Transportation needs     Medical: Not on file     Non-medical: Not on file   Tobacco Use    Smoking status: Former Smoker    Smokeless tobacco: Never Used    Tobacco comment: See Soft Chart    Substance and Sexual Activity    Alcohol use: No     Comment: Occasionally    Drug use: No    Sexual activity: Yes     Partners: Female   Lifestyle    Physical activity     Days per week: Not on file     Minutes per session: Not on file    Stress: Not on file   Relationships    Social connections     Talks on phone: Not on file     Gets together: Not on file     Attends Jewish service: Not on file     Active member of club or organization: Not on file     Attends meetings of clubs or organizations: Not on file     Relationship status: Not on file    Intimate partner violence     Fear of current or ex partner: Not on file     Emotionally abused: Not on file     Physically abused: Not on file     Forced sexual activity: Not on file   Other Topics Concern    Not on file   Social History Narrative    Not on file     Current Facility-Administered Medications   Medication Dose Route Frequency Provider Last Rate Last Dose    sodium chloride flush 0.9 % injection 10 mL  10 mL Intravenous BID Dorrene Hill, MD [place] Sicard piperacillin-tazobactam (ZOSYN) 4.5 g in dextrose 5 % 100 mL IVPB (mini-bag)  4.5 g Intravenous Once Susie Barksdale  mL/hr at 11/02/20 0841 4.5 g at 11/02/20 0841     Current Outpatient Medications   Medication Sig Dispense Refill    ibuprofen (ADVIL;MOTRIN) 600 MG tablet Take 1 tablet by mouth every 6 hours as needed for Pain 30 tablet 0    meloxicam (MOBIC) 15 MG tablet Take 15 mg by mouth daily      albuterol sulfate HFA (PROAIR HFA) 108 (90 Base) MCG/ACT inhaler Inhale 2 puffs into the lungs every 6 hours as needed for Wheezing      fluticasone (FLONASE) 50 MCG/ACT nasal spray   4    Fluticasone Furoate-Vilanterol (BREO ELLIPTA) 200-25 MCG/INH AEPB Inhale into the lungs      levalbuterol (XOPENEX) 0.31 MG/3ML nebulization Take 3 mLs by nebulization every 4 hours as needed for Wheezing or Shortness of Breath 360 mL 0    montelukast (SINGULAIR) 10 MG tablet Take 1 tablet by mouth daily 30 tablet 5     Allergies   Allergen Reactions    Prednisone     Ciclopirox Olamine      Blister really bad        Nursing Notes Reviewed    Physical Exam:  Triage VS:    ED Triage Vitals [11/02/20 0333]   Enc Vitals Group      BP (!) 146/83      Pulse 79      Resp 18      Temp 97.4 °F (36.3 °C)      Temp Source Oral      SpO2 95 %      Weight 240 lb (108.9 kg)      Height 5' 10\" (1.778 m)      Head Circumference       Peak Flow       Pain Score       Pain Loc       Pain Edu? Excl. in [address]? My pulse ox interpretation is - normal    General appearance:  No acute distress. Skin:  Warm. Dry. No rash. Neck:  Trachea midline. Heart:  Regular rate and rhythm, normal S1 & S2.    Perfusion:  intact  Respiratory:  Lungs clear to auscultation bilaterally. Respirations nonlabored. Abdominal:  diffuse abdominal tenderness greater in the right upper quadrant.   No rebound guarding or rigidity, neg Clark's sign, neg McBurney's point tenderness to palpation, normal bowel sounds, no masses, no organomegaly, no pulsatile masses  Back:  No CVA TTP  Extremity:    normal ROM   Neurological:  Alert and oriented times 3.       I have reviewed and interpreted all of the currently available lab results from this visit (if applicable):  Results for orders placed or performed during the hospital encounter of 11/02/20   CBC Auto Differential   Result Value Ref Range    WBC 15.4 (H) 4.0 - 10.5 K/CU MM    RBC 5.43 4.6 - 6.2 M/CU MM    Hemoglobin 16.0 13.5 - 18.0 GM/DL    Hematocrit 46.4 42 - 52 %    MCV 85.5 78 - 100 FL    MCH 29.5 27 - 31 PG    MCHC 34.5 32.0 - 36.0 %    RDW 13.8 11.7 - 14.9 %    Platelets 668 285 - 586 K/CU MM    MPV 9.7 7.5 - 11.1 FL    Differential Type AUTOMATED DIFFERENTIAL     Segs Relative 80.4 (H) 36 - 66 %    Lymphocytes % 13.3 (L) 24 - 44 %    Monocytes % 4.7 (H) 0 - 4 %    Eosinophils % 0.6 0 - 3 %    Basophils % 0.5 0 - 1 %    Segs Absolute 12.4 K/CU MM    Lymphocytes Absolute 2.1 K/CU MM    Monocytes Absolute 0.7 K/CU MM    Eosinophils Absolute 0.1 K/CU MM    Basophils Absolute 0.1 K/CU MM    Nucleated RBC % 0.0 %    Total Nucleated RBC 0.0 K/CU MM    Total Immature Neutrophil 0.08 K/CU MM    Immature Neutrophil % 0.5 (H) 0 - 0.43 %   Basic Metabolic Panel w/ Reflex to MG   Result Value Ref Range    Sodium 135 135 - 145 MMOL/L    Potassium 3.9 3.5 - 5.1 MMOL/L    Chloride 100 99 - 110 mMol/L    CO2 23 21 - 32 MMOL/L    Anion Gap 12 4 - 16    BUN 18 6 - 23 MG/DL    CREATININE 0.9 0.9 - 1.3 MG/DL    Glucose 154 (H) 70 - 99 MG/DL    Calcium 9.2 8.3 - 10.6 MG/DL    GFR Non-African American >60 >60 mL/min/1.73m2    GFR African American >60 >60 mL/min/1.73m2   Hepatic Function Panel   Result Value Ref Range    Alb 4.1 3.4 - 5.0 GM/DL    Total Bilirubin 0.4 0.0 - 1.0 MG/DL    Bilirubin, Direct 0.2 0.0 - 0.3 MG/DL    Bilirubin, Indirect 0.2 0 - 0.7 MG/DL    Alkaline Phosphatase 82 40 - 129 IU/L    AST 13 (L) 15 - 37 IU/L    ALT 14 10 - 40 U/L    Total Protein 6.9 6.4 - 8.2 GM/DL   Lipase   Result Value Ref Range Lipase 18 13 - 60 IU/L   Troponin   Result Value Ref Range    Troponin T <0.010 <0.01 NG/ML   Lactic Acid, Plasma   Result Value Ref Range    Lactate 2.4 (HH) 0.4 - 2.0 mMOL/L      Radiographs (if obtained):  Radiologist's Report Reviewed:  US ABDOMEN LIMITED   Final Result   1. Cholelithiasis without sonographic evidence for acute cholecystitis. 2. Fatty liver versus diffuse hepatocellular disease. 3. Nonvisualization of the pancreas. CT ABDOMEN PELVIS W IV CONTRAST Additional Contrast? None   Final Result   Diverticulosis without scan evidence for diverticulitis or other acute   process. EKG (if obtained): (All EKG's are interpreted by myself in the absence of a cardiologist)      MDM:  58 yo male with diffuse abdominal pain, nausea and vomiting. No fever. diffuse abdominal tenderness on exam greater in the RUQ. Will get CBC, BMP, lipase, hepatic panel, lactic, troponin, UA  We will get CT abdomen pelvis as well as a right upper quadrant ultrasound. Will give 1 L of normal saline, Dilaudid and Zofran. Imaging as above. Workup reveals leukocytosis, elevated lactic acid, Cholelithiasis. He was given Zofran, NS bolus, Diludid and Zosyn. Urinalysis still pending at the end of my shift. Patient was signed out to oncoming physician. Dr. Danielle Deleon. Plan at the end of my shift was for follow up on urinalysis, repeat lactic acid and admission for serial abdominal exams and further evaluation of source of pain/ infection if UA does not show signs of infection. Clinical Impression:  1. Abdominal pain, right upper quadrant    2. Nausea and vomiting, intractability of vomiting not specified, unspecified vomiting type    3. Leukocytosis, unspecified type    4. Elevated lactic acid level      Disposition referral (if applicable):  No follow-up provider specified.   Disposition medications (if applicable):  New Prescriptions    No medications on file     ED Provider Disposition Time  DISPOSITION        Comment: Please note this report has been produced using speech recognition software and may contain errors related to that system including errors in grammar, punctuation, and spelling, as well as words and phrases that may be inappropriate. Efforts were made to edit the dictations.        Nohemy Jo MD  11/02/20 8307       Nohemy Jo MD  11/02/20 8839

## 2020-11-02 NOTE — ANESTHESIA PRE PROCEDURE
Department of Anesthesiology  Preprocedure Note       Name:  Paralee Frame   Age:  59 y.o.  :  1955                                          MRN:  [id number]         Date:  2020      Surgeon: Cristela Grande):  Fiordaliza Jones MD    Procedure: Procedure(s):  CHOLECYSTECTOMY LAPAROSCOPIC    Medications prior to admission:   Prior to Admission medications    Medication Sig Start Date End Date Taking?  Authorizing Provider   ibuprofen (ADVIL;MOTRIN) 600 MG tablet Take 1 tablet by mouth every 6 hours as needed for Pain 18   RAHEEL Johnson   meloxicam (MOBIC) 15 MG tablet Take 15 mg by mouth daily    Historical Provider, MD   albuterol sulfate HFA (PROAIR HFA) 108 (90 Base) MCG/ACT inhaler Inhale 2 puffs into the lungs every 6 hours as needed for Wheezing    Historical Provider, MD   fluticasone [facility]) 50 MCG/ACT nasal spray  18   Historical Provider, MD   Fluticasone Furoate-Vilanterol (BREO ELLIPTA) 200-25 MCG/INH AEPB Inhale into the lungs    Historical Provider, MD   levalbuterol Onelia Satchel) 0.31 MG/3ML nebulization Take 3 mLs by nebulization every 4 hours as needed for Wheezing or Shortness of Breath 17   Brenda Alberto MD   montelukast (SINGULAIR) 10 MG tablet Take 1 tablet by mouth daily 16   Lesly Ferguson PA-C       Current medications:    Current Facility-Administered Medications   Medication Dose Route Frequency Provider Last Rate Last Dose    sodium chloride flush 0.9 % injection 10 mL  10 mL Intravenous BID Yadi Baird MD        HYDROmorphone (DILAUDID) injection 0.5 mg  0.5 mg Intravenous Once Mariella Miranda MD         Current Outpatient Medications   Medication Sig Dispense Refill    ibuprofen (ADVIL;MOTRIN) 600 MG tablet Take 1 tablet by mouth every 6 hours as needed for Pain 30 tablet 0    meloxicam (MOBIC) 15 MG tablet Take 15 mg by mouth daily      albuterol sulfate HFA (PROAIR HFA) 108 (90 Base) MCG/ACT inhaler Inhale 2 puffs into the lungs every 6 hours as needed for Wheezing      fluticasone (FLONASE) 50 MCG/ACT nasal spray   4    Fluticasone Furoate-Vilanterol (BREO ELLIPTA) 200-25 MCG/INH AEPB Inhale into the lungs      levalbuterol (XOPENEX) 0.31 MG/3ML nebulization Take 3 mLs by nebulization every 4 hours as needed for Wheezing or Shortness of Breath 360 mL 0    montelukast (SINGULAIR) 10 MG tablet Take 1 tablet by mouth daily 30 tablet 5       Allergies: Allergies   Allergen Reactions    Prednisone     Ciclopirox Olamine      Blister really bad        Problem List:    Patient Active Problem List   Diagnosis Code    Essential hypertension I10    Primary osteoarthritis M19.91    Mild intermittent asthma without complication S27.51    Benign prostatic hyperplasia without lower urinary tract symptoms N40.0    Environmental allergies Z91.09    COPD (chronic obstructive pulmonary disease) ([facility]) J44.9    Obesity (BMI 30.0-34. 9) E66.9    GLADYS (obstructive sleep apnea) G47.33    Dyspnea on exertion R06.00    Unstable angina pectoris ([facility]) I20.0    Family history of coronary artery disease Z82.49       Past Medical History:        Diagnosis Date    Asthma     Chronic back pain     echocardiogram 06/10/2020    EF 55-60% mild MR TR    Family history of coronary artery disease     Hearing loss     History of Doppler ultrasound 07/20/2018    No significant venous insufficiency, No DVT or SVT    History of echocardiogram 07/20/2018    EF 55-60%, No significant valvular disease, no pericardial effusion, Mild PHTN at 42 mmHg.  History of left heart catheterization 08/09/2018    NL.     History of nuclear stress test 07/11/2018    EF 62%    Hypertension     SOB (shortness of breath)        Past Surgical History:        Procedure Laterality Date    COLONOSCOPY  2005    polyp removed Dr Tori Choudhary         Social History:    Social History     Tobacco Use    Smoking status: Former Smoker    Smokeless tobacco: Never Used    Tobacco comment: See Soft Chart    Substance Use Topics    Alcohol use: No     Comment: Occasionally                                Counseling given: Not Answered  Comment: See Soft Chart       Vital Signs (Current):   Vitals:    11/02/20 0332 11/02/20 0333   BP: (!) 146/83 (!) 146/83   Pulse: 78 79   Resp: (!) 39 18   Temp:  36.3 °C (97.4 °F)   TempSrc:  Oral   SpO2: 95% 95%   Weight:  240 lb (108.9 kg)   Height:  5' 10\" (1.778 m)                                              BP Readings from Last 3 Encounters:   11/02/20 (!) 146/83   02/26/19 130/82   09/04/18 136/77       NPO Status:                                                                                 BMI:   Wt Readings from Last 3 Encounters:   11/02/20 240 lb (108.9 kg)   02/26/19 257 lb (116.6 kg)   09/04/18 250 lb (113.4 kg)     Body mass index is 34.44 kg/m². CBC:   Lab Results   Component Value Date    WBC 15.4 11/02/2020    RBC 5.43 11/02/2020    HGB 16.0 11/02/2020    HCT 46.4 11/02/2020    MCV 85.5 11/02/2020    RDW 13.8 11/02/2020     11/02/2020       CMP:   Lab Results   Component Value Date     11/02/2020    K 3.9 11/02/2020     11/02/2020    CO2 23 11/02/2020    BUN 18 11/02/2020    CREATININE 0.9 11/02/2020    GFRAA >60 11/02/2020    LABGLOM >60 11/02/2020    GLUCOSE 154 11/02/2020    PROT 6.9 11/02/2020    PROT 6.2 03/19/2012    CALCIUM 9.2 11/02/2020    BILITOT 0.4 11/02/2020    ALKPHOS 82 11/02/2020    AST 13 11/02/2020    ALT 14 11/02/2020       POC Tests: No results for input(s): POCGLU, POCNA, POCK, POCCL, POCBUN, POCHEMO, POCHCT in the last 72 hours.     Coags:   Lab Results   Component Value Date    PROTIME 11.0 08/07/2018    PROTIME 11.5 03/19/2012    INR 0.96 08/07/2018    APTT 28.9 08/07/2018       HCG (If Applicable): No results found for: PREGTESTUR, PREGSERUM, HCG, HCGQUANT     ABGs: No results found for: PHART, PO2ART, UQW1QFA, YCO8KGS, BEART, F4SKHWRP Type & Screen (If Applicable):  No results found for: LABABO, LABRH    Drug/Infectious Status (If Applicable):  No results found for: HIV, HEPCAB    COVID-19 Screening (If Applicable): No results found for: COVID19      Anesthesia Evaluation  Patient summary reviewed  Airway: Mallampati: III  TM distance: >3 FB   Neck ROM: limited  Mouth opening: > = 3 FB Dental:      Comment: Denies loose teeth     Pulmonary:   (+) COPD:  shortness of breath:  sleep apnea:  asthma:                            Cardiovascular:  Exercise tolerance: poor (<4 METS),   (+) hypertension:, angina: at rest, MARADIAGA:, pulmonary hypertension: mild,          Beta Blocker:  Not on Beta Blocker      ROS comment: EF 55-60%, No significant valvular disease, no pericardial effusion, Mild PHTN at 42 mmHg. Neuro/Psych:               GI/Hepatic/Renal:             Endo/Other:    (+) : arthritis: OA., .                 Abdominal:   (+) obese,         Vascular:                                      Anesthesia Plan      general     ASA 3       Induction: intravenous. Anesthetic plan and risks discussed with patient. Use of blood products discussed with patient whom. Plan discussed with attending. Pre Anesthesia Assessment complete.  Chart reviewed on 11/2/2020        JACOB Villalobos - CRNA   11/2/2020

## 2020-11-02 NOTE — CARE COORDINATION
CM performed utilization review on patient documentation. Patient met clinical criteria for inpatient admission through [facility] for abdominal pain. Per physician documentation: Patient complained of significant pain in the emergency department.  He is tender to palpation the right upper quadrant.  Work-up from previous physician showed elevated white blood cell count of 15.4.  From review of records, he does have a consistent elevated white blood cell count but this is more elevated than its been previously.  LFTs are within normal limits.  Imaging does show cholelithiasis without any significant signs of cholecystitis.  Given the elevated white blood cell count and patient's exam, previous provider did start fluids, pain medications as well as Zosyn.  Patient was signed out to me awaiting response to treatment as well as further imaging.  I did consult general surgery.  Given patient's white count and symptoms, general surgery does feel would be appropriate for patient to be admitted to hospitalist service for general surgery consult, serial exams and contain antibiotic treatment.  Patient is agreeable to plan of care.  He will be treated to hospitalist for further evaluation management.       Results for orders placed or performed during the hospital encounter of 11/02/20  CBC Auto Differential  Result  Value  Ref Range     WBC  15.4 (H)  4.0 - 10.5 K/CU MM     RBC  5.43  4.6 - 6.2 M/CU MM     Hemoglobin  16.0  13.5 - 18.0 GM/DL     Hematocrit  46.4  42 - 52 %     MCV  85.5  78 - 100 FL     MCH  29.5  27 - 31 PG     MCHC  34.5  32.0 - 36.0 %     RDW  13.8  11.7 - 14.9 %     Platelets  860  818 - 440 K/CU MM     MPV  9.7  7.5 - 11.1 FL     Differential Type  AUTOMATED DIFFERENTIAL        Segs Relative  80.4 (H)  36 - 66 %     Lymphocytes %  13.3 (L)  24 - 44 %     Monocytes %  4.7 (H)  0 - 4 %     Eosinophils %  0.6  0 - 3 %     Basophils %  0.5  0 - 1 %     Segs Absolute  12.4  K/CU MM     Lymphocytes Absolute  2.1  K/CU MM     Monocytes Absolute  0.7  K/CU MM     Eosinophils Absolute  0.1  K/CU MM     Basophils Absolute  0.1  K/CU MM     Nucleated RBC %  0.0  %     Total Nucleated RBC  0.0  K/CU MM     Total Immature Neutrophil  0.08  K/CU MM     Immature Neutrophil %  0.5 (H)  0 - 8.65 %  Basic Metabolic Panel w/ Reflex to MG  Result  Value  Ref Range     Sodium  135  135 - 145 MMOL/L     Potassium  3.9  3.5 - 5.1 MMOL/L     Chloride  100  99 - 110 mMol/L     CO2  23  21 - 32 MMOL/L     Anion Gap  12  4 - 16     BUN  18  6 - 23 MG/DL     CREATININE  0.9  0.9 - 1.3 MG/DL     Glucose  154 (H)  70 - 99 MG/DL     Calcium  9.2  8.3 - 10.6 MG/DL     GFR Non-African American  >60  >60 mL/min/1.73m2     GFR African American  >60  >60 mL/min/1.73m2  Hepatic Function Panel  Result  Value  Ref Range     Alb  4.1  3.4 - 5.0 GM/DL     Total Bilirubin  0.4  0.0 - 1.0 MG/DL     Bilirubin, Direct  0.2  0.0 - 0.3 MG/DL     Bilirubin, Indirect  0.2  0 - 0.7 MG/DL     Alkaline Phosphatase  82  40 - 129 IU/L     AST  13 (L)  15 - 37 IU/L     ALT  14  10 - 40 U/L     Total Protein  6.9  6.4 - 8.2 GM/DL  Lipase  Result  Value  Ref Range     Lipase  18  13 - 60 IU/L  Troponin  Result  Value  Ref Range     Troponin T  <0.010  <0.01 NG/ML  Lactic Acid, Plasma  Result  Value  Ref Range     Lactate  2.4 (HH)  0.4 - 2.0 mMOL/L    Radiographs (if obtained):  Radiologist's Report Reviewed:  US ABDOMEN LIMITED  Final Result  1. Cholelithiasis without sonographic evidence for acute cholecystitis. 2. Fatty liver versus diffuse hepatocellular disease. 3. Nonvisualization of the pancreas.        CT ABDOMEN PELVIS W IV CONTRAST Additional Contrast? None  Final Result  Diverticulosis without scan evidence for diverticulitis or other acute  process.

## 2020-11-02 NOTE — PROGRESS NOTES
1450: Arrived to PACU from OR. Monitors applied, alarms on. Report obtained from [facility] and S. Sylvester CRNA. Hearing aid in place left ear. 1515: Repositioned in bed. Warm blankets on. Medicated for abdominal discomfort, ice chips given. 1540: Friend Kasandra Juáerz called and update, room and phone numbers and security code given. Patient dozing. 1556: Transported to room 1109.

## 2020-11-02 NOTE — ED NOTES
[facility] ANSWERED     [facility] Hospital[facility].  Hussain  11/02/20 1000

## 2020-11-02 NOTE — ED PROVIDER NOTES
51-year-old male presented to the emergency department with left upper quadrant pain. He was signed out to me by previous physician. He states that his pain started abruptly at about 1:00 in the morning. He has extensive cardiac history. No history of abdominal surgery. Work-up was initiated by the previous physician. Patient complained of significant pain in the emergency department. He is tender to palpation the right upper quadrant. Work-up from previous physician showed elevated white blood cell count of 15.4. From review of records, he does have a consistent elevated white blood cell count but this is more elevated than its been previously. LFTs are within normal limits. Imaging does show cholelithiasis without any significant signs of cholecystitis. Given the elevated white blood cell count and patient's exam, previous provider did start fluids, pain medications as well as Zosyn. Patient was signed out to me awaiting response to treatment as well as further imaging. I did consult general surgery. Given patient's white count and symptoms, general surgery does feel would be appropriate for patient to be admitted to hospitalist service for general surgery consult, serial exams and contain antibiotic treatment. Patient is agreeable to plan of care. He will be treated to hospitalist for further evaluation management.     Kali Spears  11/2/2020  10:31 AM       Kali Spears MD  11/02/20 1031

## 2020-11-03 VITALS
RESPIRATION RATE: 16 BRPM | HEART RATE: 96 BPM | OXYGEN SATURATION: 89 % | DIASTOLIC BLOOD PRESSURE: 86 MMHG | SYSTOLIC BLOOD PRESSURE: 153 MMHG | WEIGHT: 240 LBS | BODY MASS INDEX: 34.36 KG/M2 | HEIGHT: 70 IN | TEMPERATURE: 98.9 F

## 2020-11-03 PROBLEM — K80.20 CALCULUS OF GALLBLADDER WITHOUT CHOLECYSTITIS WITHOUT OBSTRUCTION: Status: ACTIVE | Noted: 2020-11-03

## 2020-11-03 LAB
ALBUMIN SERPL-MCNC: 3.7 GM/DL (ref 3.4–5)
ALP BLD-CCNC: 67 IU/L (ref 40–128)
ALT SERPL-CCNC: 40 U/L (ref 10–40)
ANION GAP SERPL CALCULATED.3IONS-SCNC: 9 MMOL/L (ref 4–16)
AST SERPL-CCNC: 35 IU/L (ref 15–37)
BASOPHILS ABSOLUTE: 0 K/CU MM
BASOPHILS RELATIVE PERCENT: 0.2 % (ref 0–1)
BILIRUB SERPL-MCNC: 0.6 MG/DL (ref 0–1)
BUN BLDV-MCNC: 12 MG/DL (ref 6–23)
CALCIUM SERPL-MCNC: 8.3 MG/DL (ref 8.3–10.6)
CHLORIDE BLD-SCNC: 104 MMOL/L (ref 99–110)
CO2: 26 MMOL/L (ref 21–32)
CREAT SERPL-MCNC: 1 MG/DL (ref 0.9–1.3)
DIFFERENTIAL TYPE: ABNORMAL
EOSINOPHILS ABSOLUTE: 0.1 K/CU MM
EOSINOPHILS RELATIVE PERCENT: 0.3 % (ref 0–3)
GFR AFRICAN AMERICAN: >60 ML/MIN/1.73M2
GFR NON-AFRICAN AMERICAN: >60 ML/MIN/1.73M2
GLUCOSE BLD-MCNC: 100 MG/DL (ref 70–99)
HCT VFR BLD CALC: 43.3 % (ref 42–52)
HEMOGLOBIN: 14 GM/DL (ref 13.5–18)
IMMATURE NEUTROPHIL %: 0.6 % (ref 0–0.43)
LYMPHOCYTES ABSOLUTE: 2.9 K/CU MM
LYMPHOCYTES RELATIVE PERCENT: 15.3 % (ref 24–44)
MCH RBC QN AUTO: 28.3 PG (ref 27–31)
MCHC RBC AUTO-ENTMCNC: 32.3 % (ref 32–36)
MCV RBC AUTO: 87.7 FL (ref 78–100)
MONOCYTES ABSOLUTE: 1.4 K/CU MM
MONOCYTES RELATIVE PERCENT: 7.2 % (ref 0–4)
NUCLEATED RBC %: 0 %
PDW BLD-RTO: 14.3 % (ref 11.7–14.9)
PLATELET # BLD: 315 K/CU MM (ref 140–440)
PMV BLD AUTO: 9.3 FL (ref 7.5–11.1)
POTASSIUM SERPL-SCNC: 4.3 MMOL/L (ref 3.5–5.1)
RBC # BLD: 4.94 M/CU MM (ref 4.6–6.2)
SEGMENTED NEUTROPHILS ABSOLUTE COUNT: 14.3 K/CU MM
SEGMENTED NEUTROPHILS RELATIVE PERCENT: 76.4 % (ref 36–66)
SODIUM BLD-SCNC: 139 MMOL/L (ref 135–145)
TOTAL IMMATURE NEUTOROPHIL: 0.12 K/CU MM
TOTAL NUCLEATED RBC: 0 K/CU MM
TOTAL PROTEIN: 5.7 GM/DL (ref 6.4–8.2)
WBC # BLD: 18.8 K/CU MM (ref 4–10.5)

## 2020-11-03 PROCEDURE — 2700000000 HC OXYGEN THERAPY PER DAY

## 2020-11-03 PROCEDURE — 6370000000 HC RX 637 (ALT 250 FOR IP): Performed by: SURGERY

## 2020-11-03 PROCEDURE — 6360000002 HC RX W HCPCS: Performed by: NURSE PRACTITIONER

## 2020-11-03 PROCEDURE — 6370000000 HC RX 637 (ALT 250 FOR IP): Performed by: PHYSICIAN ASSISTANT

## 2020-11-03 PROCEDURE — 99024 POSTOP FOLLOW-UP VISIT: CPT | Performed by: NURSE PRACTITIONER

## 2020-11-03 PROCEDURE — 85025 COMPLETE CBC W/AUTO DIFF WBC: CPT

## 2020-11-03 PROCEDURE — 94150 VITAL CAPACITY TEST: CPT

## 2020-11-03 PROCEDURE — 2500000003 HC RX 250 WO HCPCS: Performed by: NURSE PRACTITIONER

## 2020-11-03 PROCEDURE — 94761 N-INVAS EAR/PLS OXIMETRY MLT: CPT

## 2020-11-03 PROCEDURE — 80053 COMPREHEN METABOLIC PANEL: CPT

## 2020-11-03 PROCEDURE — 2580000003 HC RX 258: Performed by: NURSE PRACTITIONER

## 2020-11-03 PROCEDURE — 2580000003 HC RX 258: Performed by: SURGERY

## 2020-11-03 PROCEDURE — 36415 COLL VENOUS BLD VENIPUNCTURE: CPT

## 2020-11-03 RX ORDER — HYDROCODONE BITARTRATE AND ACETAMINOPHEN 5; 325 MG/1; MG/1
1 TABLET ORAL EVERY 8 HOURS PRN
Qty: 15 TABLET | Refills: 0 | Status: SHIPPED | OUTPATIENT
Start: 2020-11-03 | End: 2020-11-06

## 2020-11-03 RX ORDER — BENZONATATE 100 MG/1
100 CAPSULE ORAL 3 TIMES DAILY PRN
Status: DISCONTINUED | OUTPATIENT
Start: 2020-11-03 | End: 2020-11-03 | Stop reason: HOSPADM

## 2020-11-03 RX ORDER — METRONIDAZOLE 500 MG/1
500 TABLET ORAL 3 TIMES DAILY
Qty: 21 TABLET | Refills: 0 | Status: SHIPPED | OUTPATIENT
Start: 2020-11-03 | End: 2020-11-10

## 2020-11-03 RX ORDER — CIPROFLOXACIN 500 MG/1
500 TABLET, FILM COATED ORAL 2 TIMES DAILY
Qty: 14 TABLET | Refills: 0 | Status: SHIPPED | OUTPATIENT
Start: 2020-11-03 | End: 2020-11-10

## 2020-11-03 RX ADMIN — HYDROCODONE BITARTRATE AND ACETAMINOPHEN 1 TABLET: 5; 325 TABLET ORAL at 02:47

## 2020-11-03 RX ADMIN — FAMOTIDINE 20 MG: 10 INJECTION INTRAVENOUS at 00:14

## 2020-11-03 RX ADMIN — PIPERACILLIN AND TAZOBACTAM 3.38 G: 3; .375 INJECTION, POWDER, LYOPHILIZED, FOR SOLUTION INTRAVENOUS at 12:19

## 2020-11-03 RX ADMIN — PIPERACILLIN AND TAZOBACTAM 3.38 G: 3; .375 INJECTION, POWDER, LYOPHILIZED, FOR SOLUTION INTRAVENOUS at 04:25

## 2020-11-03 RX ADMIN — ENOXAPARIN SODIUM 40 MG: 40 INJECTION SUBCUTANEOUS at 10:11

## 2020-11-03 RX ADMIN — SODIUM CHLORIDE: 9 INJECTION, SOLUTION INTRAVENOUS at 03:38

## 2020-11-03 RX ADMIN — BENZONATATE 100 MG: 100 CAPSULE ORAL at 04:26

## 2020-11-03 RX ADMIN — HYDROCODONE BITARTRATE AND ACETAMINOPHEN 1 TABLET: 5; 325 TABLET ORAL at 10:10

## 2020-11-03 RX ADMIN — SODIUM CHLORIDE, PRESERVATIVE FREE 10 ML: 5 INJECTION INTRAVENOUS at 10:13

## 2020-11-03 RX ADMIN — SODIUM CHLORIDE, PRESERVATIVE FREE 10 ML: 5 INJECTION INTRAVENOUS at 10:11

## 2020-11-03 RX ADMIN — FAMOTIDINE 20 MG: 10 INJECTION INTRAVENOUS at 10:12

## 2020-11-03 ASSESSMENT — PAIN DESCRIPTION - PAIN TYPE
TYPE: SURGICAL PAIN
TYPE: SURGICAL PAIN

## 2020-11-03 ASSESSMENT — PAIN DESCRIPTION - DESCRIPTORS
DESCRIPTORS: ACHING
DESCRIPTORS: ACHING

## 2020-11-03 ASSESSMENT — ENCOUNTER SYMPTOMS
EYES NEGATIVE: 1
ABDOMINAL PAIN: 1
ALLERGIC/IMMUNOLOGIC NEGATIVE: 1
RESPIRATORY NEGATIVE: 1

## 2020-11-03 ASSESSMENT — PAIN DESCRIPTION - LOCATION
LOCATION: ABDOMEN
LOCATION: ABDOMEN

## 2020-11-03 ASSESSMENT — PAIN SCALES - GENERAL
PAINLEVEL_OUTOF10: 3
PAINLEVEL_OUTOF10: 3
PAINLEVEL_OUTOF10: 6
PAINLEVEL_OUTOF10: 6

## 2020-11-03 ASSESSMENT — PAIN - FUNCTIONAL ASSESSMENT
PAIN_FUNCTIONAL_ASSESSMENT: PREVENTS OR INTERFERES SOME ACTIVE ACTIVITIES AND ADLS
PAIN_FUNCTIONAL_ASSESSMENT: PREVENTS OR INTERFERES SOME ACTIVE ACTIVITIES AND ADLS

## 2020-11-03 ASSESSMENT — PAIN DESCRIPTION - FREQUENCY
FREQUENCY: CONTINUOUS
FREQUENCY: CONTINUOUS

## 2020-11-03 ASSESSMENT — PAIN DESCRIPTION - PROGRESSION
CLINICAL_PROGRESSION: NOT CHANGED
CLINICAL_PROGRESSION: NOT CHANGED

## 2020-11-03 NOTE — PROGRESS NOTES
Appearance: Normal appearance. HENT:      Head: Normocephalic and atraumatic. Right Ear: External ear normal.      Left Ear: External ear normal.      Nose: Nose normal.      Mouth/Throat:      Mouth: Mucous membranes are dry. Neck:      Musculoskeletal: Normal range of motion and neck supple. Cardiovascular:      Rate and Rhythm: Normal rate and regular rhythm. Pulmonary:      Effort: Pulmonary effort is normal.      Breath sounds: Normal breath sounds. Abdominal:      General: Abdomen is flat. Palpations: Abdomen is soft. Tenderness: There is abdominal tenderness. Comments: Lap sites tender. Musculoskeletal: Normal range of motion. Skin:     General: Skin is warm and dry. Comments: 4 lap sites. Well approximated with skin glue. Neurological:      General: No focal deficit present. Mental Status: He is alert and oriented to person, place, and time. Mental status is at baseline.    Psychiatric:         Mood and Affect: Mood normal.         Behavior: Behavior normal.           Scheduled Meds:   sodium chloride flush  10 mL Intravenous BID    sodium chloride flush  10 mL Intravenous 2 times per day    enoxaparin  40 mg Subcutaneous Daily    famotidine (PEPCID) injection  20 mg Intravenous BID    piperacillin-tazobactam  3.375 g Intravenous Q8H     ContinuousInfusions:   sodium chloride 100 mL/hr at 11/03/20 0338     PRN Meds:benzonatate, sodium chloride flush, acetaminophen **OR** acetaminophen, polyethylene glycol, promethazine **OR** ondansetron, HYDROcodone-acetaminophen, morphine      Labs/Imaging Results:   Lab Results   Component Value Date    WBC 18.8 (H) 11/03/2020    HGB 14.0 11/03/2020    HCT 43.3 11/03/2020    MCV 87.7 11/03/2020     11/03/2020     Lab Results   Component Value Date     11/02/2020    K 3.9 11/02/2020     11/02/2020    CO2 23 11/02/2020    BUN 18 11/02/2020    CREATININE 0.9 11/02/2020    GLUCOSE 154 (H) 11/02/2020 CALCIUM 9.2 11/02/2020    PROT 6.9 11/02/2020    LABALBU 4.1 11/02/2020    BILITOT 0.4 11/02/2020    ALKPHOS 82 11/02/2020    AST 13 (L) 11/02/2020    ALT 14 11/02/2020    LABGLOM >60 11/02/2020    GFRAA >60 11/02/2020       Assessment:     Ashish Armenta is a 58 yo males POD #1 s/p laparoscopic cholecystectomy. Plan:     -Encouraged more ambulation in hallways  -Okay to be discharged from a surgical standpoint  -DC with 7 days cipro/flagyl  -Follow-up pathology  -Follow up with Dr. Jerrell Kelley in the office in 1-2 weeks  -Discharge instructions provided.  Patient verbalizes understanding    Electronically signed by JACOB Lima CNP on 11/3/2020 at 7:36 AM

## 2020-11-03 NOTE — PROGRESS NOTES
Physician Progress Note      Mele Jaimes  CSN #:                  [id number]  :                       1955  ADMIT DATE:       2020 3:23 AM  DISCH DATE:  RESPONDING  PROVIDER #:        [facility]          QUERY TEXT:    Pt admitted with symptomatic cholelithiasis with acute cholecystectomy. Pt   noted to have lactate level 2.4. If possible, please document in the progress   notes and discharge summary if you are evaluating and/or treating any of the   following: The medical record reflects the following:    Risk Factors:  cholelithiasis/cholecystectomy  Clinical Indicators: Lactate 2.4  Treatment: IVF bolus x1, continuous IVF    Thank you! Omer Donovan, CRCR  RN Clinical   P: [phone]  Options provided:  -- Lactic Acidosis  -- Elevated lactate insignificant not acidosis  -- Other - I will add my own diagnosis  -- Disagree - Not applicable / Not valid  -- Disagree - Clinically unable to determine / Unknown  -- Refer to Clinical Documentation Reviewer    PROVIDER RESPONSE TEXT:    This patient has lactic acidosis.     Query created by: Lenny Sherwood on 11/3/2020 12:54 PM      Electronically signed by:  [facility] 11/3/2020 1:07 PM

## 2020-11-03 NOTE — PROGRESS NOTES
Pt ambulated approximately 150 ft. In [place], pt became SOB. Pt back in room and coughed, said he felt a lump to left side above surgical incision. Firm lump noted. CNP notified, will come assess. Pt reassessed, resting in bed after lunch. No s/s distress noted.

## 2020-11-03 NOTE — DISCHARGE SUMMARY
Hospital Medicine  DISCHARGE SUMMARY  Date: 11/3/2020  Name: Janet Archibald  MRN: [id number]  YOB: 1955     Patient's PCP: Amanda Dior MD  Admit Date: 11/2/2020   Discharge Date: 11/3/2020  Admitting Physician: Swathi Tay MD  Discharge Physician: Erin Bains MD      Discharge Diagnoses:  Acute on chronic calculus cholecystitis s/p lap cholecystectomy 11/2  Essential hypertension  Hypoxia      HPI:    Chief Complaint   Patient presents with    Abdominal Pain    Emesis    Shortness of Breath     Janet Archibald is a 59 y.o. male who presents with abdominal pain. Reports onset acutely overnight. Describes RUQ sharp stabbing pain. Currently controlled 2/10 with pain control provided in Ed. He has associated SOB, nausea and vomiting. He denies any precipitating factors. Hospital Course  Patient came in with abdominal pain. He was suspected of having cholecystitis. He was placed on IV antibiotics. He had a lap cholecystectomy performed on 11/2 and was found to have acute on chronic cholecystitis. He was tolerating a diet. He was discharged on ciprofloxacin and Flagyl for 7 days. He was given Norco for pain. He will need follow-up with Dr. Salima Norwood in 1 to 2 weeks. Patient had hypoxia postop. He was on 2 L nasal cannula. Chest x-ray showed no acute disease. COVID-19 was negative. He was eventually weaned off oxygen. Patient was stable. He was discharged to home.     Physical Exam:  BP (!) 141/83   Pulse 104   Temp 98.3 °F (36.8 °C) (Oral)   Resp 16   Ht 5' 10\" (1.778 m)   Wt 240 lb (108.9 kg)   SpO2 93%   BMI 34.44 kg/m²   CONSTITUTIONAL:  No acute distress  HENT:  NCAT  LUNGS:  cta b/l bs+  CARDIOVASCULAR:  s1s2 rrr  ABDOMEN:  Soft mild tenderness in LUQ, has surgical wound  GENITAL/URINARY:  No simmons  MUSCULOSKELETAL/Extremities:  No edema, nontender  NEUROLOGIC:  Hard of hearing, otherwise no gross deficits, aao  SKIN:  No gross lesions besides surgical wounds    Consultations  IP CONSULT TO GENERAL SURGERY  IP CONSULT TO HOSPITALIST    Invasive procedures:   11/2 Laparoscopic Cholecystectomy    Significant Diagnostic Studies:    Imaging  Ct Abdomen Pelvis W Iv Contrast Additional Contrast? None  Result Date: 11/2/2020  Diverticulosis without scan evidence for diverticulitis or other acute process. Xr Chest 1 View  Result Date: 11/2/2020  1. No active pulmonary disease. Us Abdomen Limited  Result Date: 11/2/2020  1. Cholelithiasis without sonographic evidence for acute cholecystitis. 2. Fatty liver versus diffuse hepatocellular disease. 3. Nonvisualization of the pancreas. Code Status:  Full Code      Discharge Medications:     Medication List      START taking these medications    ciprofloxacin 500 MG tablet  Commonly known as:  CIPRO  Take 1 tablet by mouth 2 times daily for 7 days     HYDROcodone-acetaminophen 5-325 MG per tablet  Commonly known as:  NORCO  Take 1 tablet by mouth every 8 hours as needed for Pain for up to 3 days.      metroNIDAZOLE 500 MG tablet  Commonly known as:  FLAGYL  Take 1 tablet by mouth 3 times daily for 7 days        CONTINUE taking these medications    calcium carbonate 600 MG Tabs tablet     Centrum Silver 50+Men Tabs     fluticasone-salmeterol 250-50 MCG/DOSE Aepb  Commonly known as:  ADVAIR     magnesium 250 MG Tabs tablet  Commonly known as:  MAGNESIUM-OXIDE     montelukast 10 MG tablet  Commonly known as:  SINGULAIR  Take 1 tablet by mouth daily     ProAir  (90 Base) MCG/ACT inhaler  Generic drug:  albuterol sulfate HFA        STOP taking these medications    Breo Ellipta 200-25 MCG/INH Aepb inhaler  Generic drug:  Fluticasone furoate-vilanterol     fluticasone 50 MCG/ACT nasal spray  Commonly known as:  FLONASE     ibuprofen 600 MG tablet  Commonly known as:  ADVIL;MOTRIN     levalbuterol 0.31 MG/3ML nebulization  Commonly known as:  Xopenex     Mobic 15 MG tablet  Generic drug:  meloxicam           Where to

## 2020-11-03 NOTE — ANESTHESIA POSTPROCEDURE EVALUATION
Department of Anesthesiology  Postprocedure Note    Patient: Sammi Jung  MRN: [id number]  YOB: 1955  Date of evaluation: 11/2/2020  Time:  9:16 PM     Procedure Summary     Date:  11/02/20 Room / Location:  [place] [address] / [facility]    Anesthesia Start:  1867 Anesthesia Stop:  9909    Procedure:  CHOLECYSTECTOMY LAPAROSCOPIC (N/A Abdomen) Diagnosis:  (CHOLES)    Surgeon:  Rosita Robert MD Responsible Provider:  JACOB Triana CRNA    Anesthesia Type:  general ASA Status:  3          Anesthesia Type: general    Nancy Phase I: Nancy Score: 8    Nancy Phase II:      Last vitals: Reviewed and per EMR flowsheets.        Anesthesia Post Evaluation    Patient location during evaluation: PACU  Patient participation: complete - patient participated  Level of consciousness: awake  Pain score: 3  Airway patency: patent  Nausea & Vomiting: no vomiting and no nausea  Complications: no  Cardiovascular status: blood pressure returned to baseline and hemodynamically stable  Respiratory status: acceptable  Hydration status: stable

## 2020-11-04 ENCOUNTER — CARE COORDINATION (OUTPATIENT)
Dept: CASE MANAGEMENT | Age: 65
End: 2020-11-04

## 2020-11-04 NOTE — CARE COORDINATION
[facility] Transitions Initial Follow Up Call    Call within 2 business days of discharge: Yes    Patient: Chidi Ricci Patient : 1955   MRN: [id number]  Reason for Admission:   RUQ Abdominal pain/ Laparoscopic Cholecystectomy  Discharge Date: 11/3/20 RARS: Readmission Risk Score: 9      Last Discharge [facility]       Complaint Diagnosis Description Type Department Provider    20 Abdominal Pain; Emesis; Shortness of Breath Abdominal pain, right upper quadrant . .. ED [facility] (Dis[facility]; Bob Medina, ... Spoke with:   Patient / S.O.    Non-face-to-face services provided:  Education of patient/family/caregiver/guardian to support self-management-1        COVID-19:  Not detected 20     Method of communication with provider : N/A    Advance Care Planning:   Does patient have an Advanced Directives:  No  Confirmed patient's GF; [employer] as Primary HCDM    Was this a readmission? No  Patient stated reason for admission: RUQ pain  Patients top risk factors for readmission: COPD    Care Transition Nurse (CTN) contacted the patient by telephone to perform post hospital discharge assessment. Verified name and  with patient/ s.o. as identifiers. Provided introduction to self, and explanation of the CTN role. Patient s.o reports that patient is feeling well. Denies increased pain, erythema, swelling or drainage to incision site. Reviewed infection prevention measures and s/s to report to MD.    Patient s.o reports that patient is tolerating his diet as instructed. Bowels moved yesterday \" alittle slow\"; Encouraged hydration/ activity as directed. Urinating qs. Patient denies fever, chills, N/V/D, body aches or COVID related symptoms. Discussed COVID risk/ infection prevention measures such as proper hand hygiene, social distancing , mask, and disinfection of shared surfaces.   Instructed on s/s to report to MD.    CTLOLIS reviewed discharge instructions, medical action plan and red flags with patient s.o. who verbalized understanding. Patient s.o given an opportunity to ask questions and does not have any further questions or concerns at this time. Were discharge instructions available to patient? Yes. Reviewed appropriate site of care based on symptoms and resources available to patient including: COVID risk education. The patient s.o. agrees to contact the PCP office for questions related to their healthcare. Medication reconciliation was performed with patient s.o, who verbalizes understanding of administration of home medications. Advised obtaining a 90-day supply of all daily and as-needed medications. Patient reports that his brother is picking up Cipro and Flagyl this morning. Encouraged patient to complete entire course of antibiotic regimen. Covid Risk Education    Patient has following risk factors of: COPD. Education provided regarding infection prevention, and signs and symptoms of COVID-19 and when to seek medical attention with patient s.o who verbalized understanding. Discussed exposure protocols and quarantine From CDC: Are you at higher risk for severe illness?   and given an opportunity for questions and concerns. The patient s.o agrees to contact the COVID-19 hotline [phone] or PCP office for questions related to COVID-19. For more information on steps you can take to protect yourself, see CDC's How to Protect Yourself     Patient/family/caregiver given information for Wil[facility] and agrees to enroll :  Yes  Patient's preferred e-mail: [email or web address]  Patient's preferred phone number: [phone]  LOOP scheduled    Discussed follow-up appointments. If no appointment was previously scheduled, appointment scheduling offered:  Yes. Is follow up appointment scheduled within 7 days of discharge? Yes.   Non-[facility] follow up appointment(s):   11/9 Dr. Emmett Nichols 11/17    Patient will

## 2020-11-05 ENCOUNTER — TELEPHONE (OUTPATIENT)
Dept: BARIATRICS/WEIGHT MGMT | Age: 65
End: 2020-11-05

## 2020-11-05 RX ORDER — SENNA PLUS 8.6 MG/1
1 TABLET ORAL 2 TIMES DAILY
Qty: 28 TABLET | Refills: 0 | Status: ON HOLD | OUTPATIENT
Start: 2020-11-05 | End: 2021-01-03 | Stop reason: ALTCHOICE

## 2020-11-05 NOTE — TELEPHONE ENCOUNTER
Please make sure pt is taking the stool softener he was sent home with (2 weeks worth). If he is still having issues going to [facility] and is taking that please let her know the followin. Make sure he is taking in plenty of water. 2. Minimize narcotic pain medication as it will slow down the gut. 3. If still no BM, then options include a dulcolax suppository PRN (no Rx needed). 4. If no luck with that, then trial of over the counter magnesium citrate (no Rx needed). Drink 1/2 bottle to start. If no BM, then finish bottle.

## 2020-11-05 NOTE — TELEPHONE ENCOUNTER
Patient advised, senokot was not sent to pharmacy at [facility], sent rx to pharmacy

## 2020-11-06 LAB
EKG ATRIAL RATE: 78 BPM
EKG DIAGNOSIS: NORMAL
EKG P AXIS: 50 DEGREES
EKG P-R INTERVAL: 158 MS
EKG Q-T INTERVAL: 376 MS
EKG QRS DURATION: 92 MS
EKG QTC CALCULATION (BAZETT): 428 MS
EKG R AXIS: -26 DEGREES
EKG T AXIS: 25 DEGREES
EKG VENTRICULAR RATE: 78 BPM

## 2020-11-17 ENCOUNTER — OFFICE VISIT (OUTPATIENT)
Dept: BARIATRICS/WEIGHT MGMT | Age: 65
End: 2020-11-17

## 2020-11-17 VITALS
BODY MASS INDEX: 35.62 KG/M2 | WEIGHT: 248.8 LBS | DIASTOLIC BLOOD PRESSURE: 72 MMHG | TEMPERATURE: 97.9 F | RESPIRATION RATE: 16 BRPM | HEIGHT: 70 IN | SYSTOLIC BLOOD PRESSURE: 130 MMHG | OXYGEN SATURATION: 94 % | HEART RATE: 97 BPM

## 2020-11-17 PROCEDURE — 99024 POSTOP FOLLOW-UP VISIT: CPT | Performed by: SURGERY

## 2020-11-17 NOTE — PROGRESS NOTES
Smokeless tobacco: Never Used    Tobacco comment: See Soft Chart    Substance and Sexual Activity    Alcohol use: No     Comment: Occasionally    Drug use: No    Sexual activity: Yes     Partners: Female   Lifestyle    Physical activity     Days per week: Not on file     Minutes per session: Not on file    Stress: Not on file   Relationships    Social connections     Talks on phone: Not on file     Gets together: Not on file     Attends Rastafari service: Not on file     Active member of club or organization: Not on file     Attends meetings of clubs or organizations: Not on file     Relationship status: Not on file    Intimate partner violence     Fear of current or ex partner: Not on file     Emotionally abused: Not on file     Physically abused: Not on file     Forced sexual activity: Not on file   Other Topics Concern    Not on file   Social History Narrative    Not on file       OBJECTIVE:  Physical Exam  Vitals signs reviewed. Constitutional:       Appearance: Normal appearance. HENT:      Head: Normocephalic and atraumatic. Right Ear: External ear normal.      Left Ear: External ear normal.   Eyes:      General:         Right eye: No discharge. Left eye: No discharge. Extraocular Movements: Extraocular movements intact. Neck:      Musculoskeletal: Normal range of motion. Cardiovascular:      Rate and Rhythm: Normal rate. Pulmonary:      Effort: No respiratory distress. Abdominal:      Palpations: Abdomen is soft. Tenderness: There is no abdominal tenderness. Comments: Incisions well-healed     Musculoskeletal: Normal range of motion. Skin:     General: Skin is warm. Coloration: Skin is not jaundiced. Neurological:      General: No focal deficit present. Mental Status: He is alert.    Psychiatric:         Mood and Affect: Mood normal.           Pathology report reveals:   Final Pathologic Diagnosis:   Gallbladder, cholecystectomy:   -     Acute and chronic cholecystitis. -     Cholelithiasis. ASSESSMENT:      1. S/P laparoscopic cholecystectomy        PLAN:  1. Reviewed pathology report  2. Diet - as tolerated. 3. Activity - increase. Full at 4 weeks post-op. D/w pt.  4. Follow up PRN. 5. Call with any questions, issues, concerns. No orders of the defined types were placed in this encounter. No orders of the defined types were placed in this encounter. Follow Up: No follow-ups on file.     Kelli Conte MD

## 2021-01-03 ENCOUNTER — APPOINTMENT (OUTPATIENT)
Dept: GENERAL RADIOLOGY | Age: 66
DRG: 177 | End: 2021-01-03
Payer: MEDICARE

## 2021-01-03 ENCOUNTER — HOSPITAL ENCOUNTER (INPATIENT)
Age: 66
LOS: 5 days | Discharge: HOME | DRG: 177 | End: 2021-01-08
Attending: EMERGENCY MEDICINE | Admitting: INTERNAL MEDICINE
Payer: MEDICARE

## 2021-01-03 DIAGNOSIS — U07.1 ACUTE RESPIRATORY FAILURE DUE TO COVID-19 (HCC): ICD-10-CM

## 2021-01-03 DIAGNOSIS — J96.00 ACUTE RESPIRATORY FAILURE DUE TO COVID-19 (HCC): ICD-10-CM

## 2021-01-03 DIAGNOSIS — U07.1 COVID-19: Primary | ICD-10-CM

## 2021-01-03 DIAGNOSIS — J96.01 ACUTE RESPIRATORY FAILURE WITH HYPOXIA (HCC): ICD-10-CM

## 2021-01-03 PROBLEM — R09.02 HYPOXIA: Status: ACTIVE | Noted: 2021-01-03

## 2021-01-03 LAB
ALBUMIN SERPL-MCNC: 3.6 GM/DL (ref 3.4–5)
ALP BLD-CCNC: 63 IU/L (ref 40–129)
ALT SERPL-CCNC: 22 U/L (ref 10–40)
ANION GAP SERPL CALCULATED.3IONS-SCNC: 12 MMOL/L (ref 4–16)
AST SERPL-CCNC: 35 IU/L (ref 15–37)
BACTERIA: NEGATIVE /HPF
BASOPHILS ABSOLUTE: 0 K/CU MM
BASOPHILS RELATIVE PERCENT: 0.4 % (ref 0–1)
BILIRUB SERPL-MCNC: 0.5 MG/DL (ref 0–1)
BILIRUBIN URINE: NEGATIVE MG/DL
BLOOD, URINE: NEGATIVE
BUN BLDV-MCNC: 15 MG/DL (ref 6–23)
CALCIUM SERPL-MCNC: 8.1 MG/DL (ref 8.3–10.6)
CAST TYPE: ABNORMAL /HPF
CHLORIDE BLD-SCNC: 93 MMOL/L (ref 99–110)
CLARITY: CLEAR
CO2: 23 MMOL/L (ref 21–32)
COLOR: YELLOW
CREAT SERPL-MCNC: 1.1 MG/DL (ref 0.9–1.3)
CRYSTAL TYPE: NEGATIVE /HPF
DIFFERENTIAL TYPE: ABNORMAL
EOSINOPHILS ABSOLUTE: 0 K/CU MM
EOSINOPHILS RELATIVE PERCENT: 0 % (ref 0–3)
EPITHELIAL CELLS, UA: 3 /HPF
GFR AFRICAN AMERICAN: >60 ML/MIN/1.73M2
GFR NON-AFRICAN AMERICAN: >60 ML/MIN/1.73M2
GLUCOSE BLD-MCNC: 117 MG/DL (ref 70–99)
GLUCOSE, URINE: NEGATIVE MG/DL
HCT VFR BLD CALC: 44.2 % (ref 42–52)
HEMOGLOBIN: 14.7 GM/DL (ref 13.5–18)
IMMATURE NEUTROPHIL %: 0.3 % (ref 0–0.43)
KETONES, URINE: NEGATIVE MG/DL
LACTATE: 1.1 MMOL/L (ref 0.4–2)
LEUKOCYTE ESTERASE, URINE: NEGATIVE
LYMPHOCYTES ABSOLUTE: 1.1 K/CU MM
LYMPHOCYTES RELATIVE PERCENT: 11.3 % (ref 24–44)
MCH RBC QN AUTO: 28 PG (ref 27–31)
MCHC RBC AUTO-ENTMCNC: 33.3 % (ref 32–36)
MCV RBC AUTO: 84.2 FL (ref 78–100)
MONOCYTES ABSOLUTE: 1 K/CU MM
MONOCYTES RELATIVE PERCENT: 10.5 % (ref 0–4)
NITRITE URINE, QUANTITATIVE: NEGATIVE
PDW BLD-RTO: 13.5 % (ref 11.7–14.9)
PH, URINE: 6 (ref 5–8)
PLATELET # BLD: 244 K/CU MM (ref 140–440)
PMV BLD AUTO: 9.8 FL (ref 7.5–11.1)
POTASSIUM SERPL-SCNC: 4 MMOL/L (ref 3.5–5.1)
PRO-BNP: 54.36 PG/ML
PROTEIN UA: ABNORMAL MG/DL
RAPID INFLUENZA  B AGN: NEGATIVE
RAPID INFLUENZA A AGN: NEGATIVE
RBC # BLD: 5.25 M/CU MM (ref 4.6–6.2)
RBC URINE: 0 /HPF (ref 0–3)
SARS-COV-2, NAAT: DETECTED
SEGMENTED NEUTROPHILS ABSOLUTE COUNT: 7.6 K/CU MM
SEGMENTED NEUTROPHILS RELATIVE PERCENT: 77.5 % (ref 36–66)
SODIUM BLD-SCNC: 128 MMOL/L (ref 135–145)
SOURCE: ABNORMAL
SPECIFIC GRAVITY UA: 1.01 (ref 1–1.03)
TOTAL IMMATURE NEUTOROPHIL: 0.03 K/CU MM
TOTAL PROTEIN: 7.1 GM/DL (ref 6.4–8.2)
TROPONIN T: <0.01 NG/ML
UROBILINOGEN, URINE: 0.2 MG/DL (ref 0.2–1)
WBC # BLD: 9.8 K/CU MM (ref 4–10.5)
WBC UA: 1 /HPF (ref 0–2)

## 2021-01-03 PROCEDURE — 96375 TX/PRO/DX INJ NEW DRUG ADDON: CPT

## 2021-01-03 PROCEDURE — 83605 ASSAY OF LACTIC ACID: CPT

## 2021-01-03 PROCEDURE — 84484 ASSAY OF TROPONIN QUANT: CPT

## 2021-01-03 PROCEDURE — 6360000002 HC RX W HCPCS: Performed by: EMERGENCY MEDICINE

## 2021-01-03 PROCEDURE — 93005 ELECTROCARDIOGRAM TRACING: CPT | Performed by: EMERGENCY MEDICINE

## 2021-01-03 PROCEDURE — 87081 CULTURE SCREEN ONLY: CPT

## 2021-01-03 PROCEDURE — 87449 NOS EACH ORGANISM AG IA: CPT

## 2021-01-03 PROCEDURE — 85025 COMPLETE CBC W/AUTO DIFF WBC: CPT

## 2021-01-03 PROCEDURE — 87804 INFLUENZA ASSAY W/OPTIC: CPT

## 2021-01-03 PROCEDURE — 81001 URINALYSIS AUTO W/SCOPE: CPT

## 2021-01-03 PROCEDURE — 82306 VITAMIN D 25 HYDROXY: CPT

## 2021-01-03 PROCEDURE — 86141 C-REACTIVE PROTEIN HS: CPT

## 2021-01-03 PROCEDURE — 85610 PROTHROMBIN TIME: CPT

## 2021-01-03 PROCEDURE — 86900 BLOOD TYPING SEROLOGIC ABO: CPT

## 2021-01-03 PROCEDURE — 85730 THROMBOPLASTIN TIME PARTIAL: CPT

## 2021-01-03 PROCEDURE — 87430 STREP A AG IA: CPT

## 2021-01-03 PROCEDURE — U0002 COVID-19 LAB TEST NON-CDC: HCPCS

## 2021-01-03 PROCEDURE — 80053 COMPREHEN METABOLIC PANEL: CPT

## 2021-01-03 PROCEDURE — 71045 X-RAY EXAM CHEST 1 VIEW: CPT

## 2021-01-03 PROCEDURE — 86901 BLOOD TYPING SEROLOGIC RH(D): CPT

## 2021-01-03 PROCEDURE — 85379 FIBRIN DEGRADATION QUANT: CPT

## 2021-01-03 PROCEDURE — 96374 THER/PROPH/DIAG INJ IV PUSH: CPT

## 2021-01-03 PROCEDURE — 83615 LACTATE (LD) (LDH) ENZYME: CPT

## 2021-01-03 PROCEDURE — 96361 HYDRATE IV INFUSION ADD-ON: CPT

## 2021-01-03 PROCEDURE — 1200000000 HC SEMI PRIVATE

## 2021-01-03 PROCEDURE — 99283 EMERGENCY DEPT VISIT LOW MDM: CPT

## 2021-01-03 PROCEDURE — 2580000003 HC RX 258: Performed by: NURSE PRACTITIONER

## 2021-01-03 PROCEDURE — 82728 ASSAY OF FERRITIN: CPT

## 2021-01-03 PROCEDURE — 83880 ASSAY OF NATRIURETIC PEPTIDE: CPT

## 2021-01-03 PROCEDURE — 87899 AGENT NOS ASSAY W/OPTIC: CPT

## 2021-01-03 PROCEDURE — 86850 RBC ANTIBODY SCREEN: CPT

## 2021-01-03 PROCEDURE — 85384 FIBRINOGEN ACTIVITY: CPT

## 2021-01-03 PROCEDURE — 87040 BLOOD CULTURE FOR BACTERIA: CPT

## 2021-01-03 PROCEDURE — 84145 PROCALCITONIN (PCT): CPT

## 2021-01-03 PROCEDURE — 2580000003 HC RX 258: Performed by: EMERGENCY MEDICINE

## 2021-01-03 RX ORDER — SODIUM CHLORIDE 0.9 % (FLUSH) 0.9 %
10 SYRINGE (ML) INJECTION PRN
Status: DISCONTINUED | OUTPATIENT
Start: 2021-01-03 | End: 2021-01-08 | Stop reason: HOSPADM

## 2021-01-03 RX ORDER — ONDANSETRON 2 MG/ML
4 INJECTION INTRAMUSCULAR; INTRAVENOUS EVERY 6 HOURS PRN
Status: DISCONTINUED | OUTPATIENT
Start: 2021-01-03 | End: 2021-01-08 | Stop reason: HOSPADM

## 2021-01-03 RX ORDER — ALBUTEROL SULFATE 90 UG/1
2 AEROSOL, METERED RESPIRATORY (INHALATION) EVERY 6 HOURS PRN
Status: DISCONTINUED | OUTPATIENT
Start: 2021-01-03 | End: 2021-01-08 | Stop reason: HOSPADM

## 2021-01-03 RX ORDER — SODIUM CHLORIDE 9 MG/ML
INJECTION, SOLUTION INTRAVENOUS PRN
Status: DISCONTINUED | OUTPATIENT
Start: 2021-01-03 | End: 2021-01-03

## 2021-01-03 RX ORDER — SENNA PLUS 8.6 MG/1
1 TABLET ORAL 2 TIMES DAILY
Status: DISCONTINUED | OUTPATIENT
Start: 2021-01-04 | End: 2021-01-08 | Stop reason: HOSPADM

## 2021-01-03 RX ORDER — PROMETHAZINE HYDROCHLORIDE 12.5 MG/1
12.5 TABLET ORAL EVERY 6 HOURS PRN
Status: DISCONTINUED | OUTPATIENT
Start: 2021-01-03 | End: 2021-01-08 | Stop reason: HOSPADM

## 2021-01-03 RX ORDER — CALCIUM GLUCONATE 20 MG/ML
1 INJECTION, SOLUTION INTRAVENOUS ONCE
Status: COMPLETED | OUTPATIENT
Start: 2021-01-04 | End: 2021-01-04

## 2021-01-03 RX ORDER — GUAIFENESIN/DEXTROMETHORPHAN 100-10MG/5
5 SYRUP ORAL EVERY 4 HOURS PRN
Status: DISCONTINUED | OUTPATIENT
Start: 2021-01-03 | End: 2021-01-08 | Stop reason: HOSPADM

## 2021-01-03 RX ORDER — DEXAMETHASONE SODIUM PHOSPHATE 4 MG/ML
10 INJECTION, SOLUTION INTRA-ARTICULAR; INTRALESIONAL; INTRAMUSCULAR; INTRAVENOUS; SOFT TISSUE ONCE
Status: COMPLETED | OUTPATIENT
Start: 2021-01-03 | End: 2021-01-03

## 2021-01-03 RX ORDER — VITAMIN B COMPLEX
2000 TABLET ORAL DAILY
Status: DISCONTINUED | OUTPATIENT
Start: 2021-01-04 | End: 2021-01-08 | Stop reason: HOSPADM

## 2021-01-03 RX ORDER — ACETAMINOPHEN 325 MG/1
650 TABLET ORAL EVERY 6 HOURS PRN
Status: DISCONTINUED | OUTPATIENT
Start: 2021-01-03 | End: 2021-01-08 | Stop reason: HOSPADM

## 2021-01-03 RX ORDER — CALCIUM CARBONATE 500(1250)
500 TABLET ORAL DAILY
Status: DISCONTINUED | OUTPATIENT
Start: 2021-01-04 | End: 2021-01-08 | Stop reason: HOSPADM

## 2021-01-03 RX ORDER — SODIUM CHLORIDE 9 MG/ML
INJECTION, SOLUTION INTRAVENOUS CONTINUOUS
Status: ACTIVE | OUTPATIENT
Start: 2021-01-03 | End: 2021-01-04

## 2021-01-03 RX ORDER — SODIUM CHLORIDE 0.9 % (FLUSH) 0.9 %
10 SYRINGE (ML) INJECTION EVERY 12 HOURS SCHEDULED
Status: DISCONTINUED | OUTPATIENT
Start: 2021-01-03 | End: 2021-01-08 | Stop reason: HOSPADM

## 2021-01-03 RX ORDER — M-VIT,TX,IRON,MINS/CALC/FOLIC 27MG-0.4MG
1 TABLET ORAL DAILY
Status: DISCONTINUED | OUTPATIENT
Start: 2021-01-04 | End: 2021-01-08 | Stop reason: HOSPADM

## 2021-01-03 RX ORDER — POLYETHYLENE GLYCOL 3350 17 G/17G
17 POWDER, FOR SOLUTION ORAL DAILY PRN
Status: DISCONTINUED | OUTPATIENT
Start: 2021-01-03 | End: 2021-01-08 | Stop reason: HOSPADM

## 2021-01-03 RX ORDER — MONTELUKAST SODIUM 10 MG/1
10 TABLET ORAL DAILY
Status: DISCONTINUED | OUTPATIENT
Start: 2021-01-04 | End: 2021-01-08 | Stop reason: HOSPADM

## 2021-01-03 RX ORDER — BUDESONIDE AND FORMOTEROL FUMARATE DIHYDRATE 160; 4.5 UG/1; UG/1
2 AEROSOL RESPIRATORY (INHALATION) 2 TIMES DAILY
Status: DISCONTINUED | OUTPATIENT
Start: 2021-01-04 | End: 2021-01-08 | Stop reason: HOSPADM

## 2021-01-03 RX ORDER — MAGNESIUM OXIDE 400 MG/1
200 TABLET ORAL DAILY
Status: DISCONTINUED | OUTPATIENT
Start: 2021-01-04 | End: 2021-01-08 | Stop reason: HOSPADM

## 2021-01-03 RX ORDER — ACETAMINOPHEN 650 MG/1
650 SUPPOSITORY RECTAL EVERY 6 HOURS PRN
Status: DISCONTINUED | OUTPATIENT
Start: 2021-01-03 | End: 2021-01-08 | Stop reason: HOSPADM

## 2021-01-03 RX ORDER — 0.9 % SODIUM CHLORIDE 0.9 %
30 INTRAVENOUS SOLUTION INTRAVENOUS ONCE
Status: COMPLETED | OUTPATIENT
Start: 2021-01-03 | End: 2021-01-03

## 2021-01-03 RX ORDER — ONDANSETRON 2 MG/ML
4 INJECTION INTRAMUSCULAR; INTRAVENOUS ONCE
Status: COMPLETED | OUTPATIENT
Start: 2021-01-03 | End: 2021-01-03

## 2021-01-03 RX ADMIN — SODIUM CHLORIDE 1000 ML: 9 INJECTION, SOLUTION INTRAVENOUS at 17:15

## 2021-01-03 RX ADMIN — ONDANSETRON 4 MG: 2 INJECTION INTRAMUSCULAR; INTRAVENOUS at 18:47

## 2021-01-03 RX ADMIN — DEXAMETHASONE SODIUM PHOSPHATE 10 MG: 4 INJECTION, SOLUTION INTRAMUSCULAR; INTRAVENOUS at 18:47

## 2021-01-03 RX ADMIN — SODIUM CHLORIDE, PRESERVATIVE FREE 10 ML: 5 INJECTION INTRAVENOUS at 23:12

## 2021-01-03 RX ADMIN — SODIUM CHLORIDE: 9 INJECTION, SOLUTION INTRAVENOUS at 23:11

## 2021-01-03 ASSESSMENT — PAIN DESCRIPTION - DESCRIPTORS: DESCRIPTORS: ACHING

## 2021-01-03 ASSESSMENT — ENCOUNTER SYMPTOMS
BACK PAIN: 0
TROUBLE SWALLOWING: 0
PHOTOPHOBIA: 0
STRIDOR: 0
CHEST TIGHTNESS: 1
WHEEZING: 0
SORE THROAT: 1
SINUS PRESSURE: 1
COLOR CHANGE: 0
VOICE CHANGE: 0
COUGH: 1
SHORTNESS OF BREATH: 1
ABDOMINAL PAIN: 0
VOMITING: 1
FACIAL SWELLING: 0
NAUSEA: 1
BLOOD IN STOOL: 0

## 2021-01-03 ASSESSMENT — PAIN SCALES - GENERAL: PAINLEVEL_OUTOF10: 4

## 2021-01-03 ASSESSMENT — PAIN DESCRIPTION - FREQUENCY: FREQUENCY: CONTINUOUS

## 2021-01-03 ASSESSMENT — PAIN - FUNCTIONAL ASSESSMENT: PAIN_FUNCTIONAL_ASSESSMENT: PREVENTS OR INTERFERES SOME ACTIVE ACTIVITIES AND ADLS

## 2021-01-04 PROBLEM — U07.1 ACUTE RESPIRATORY FAILURE DUE TO COVID-19 (HCC): Status: ACTIVE | Noted: 2021-01-04

## 2021-01-04 PROBLEM — J96.00 ACUTE RESPIRATORY FAILURE DUE TO COVID-19 (HCC): Status: ACTIVE | Noted: 2021-01-04

## 2021-01-04 LAB
ABO/RH: NORMAL
ALBUMIN SERPL-MCNC: 3.5 GM/DL (ref 3.4–5)
ALBUMIN SERPL-MCNC: 3.5 GM/DL (ref 3.4–5)
ALP BLD-CCNC: 57 IU/L (ref 40–128)
ALP BLD-CCNC: 57 IU/L (ref 40–129)
ALT SERPL-CCNC: 22 U/L (ref 10–40)
ALT SERPL-CCNC: 22 U/L (ref 10–40)
ANION GAP SERPL CALCULATED.3IONS-SCNC: 8 MMOL/L (ref 4–16)
ANTIBODY DATA: NORMAL
ANTIBODY SCREEN: POSITIVE
APTT: 48.8 SECONDS (ref 25.1–37.1)
AST SERPL-CCNC: 30 IU/L (ref 15–37)
AST SERPL-CCNC: 30 IU/L (ref 15–37)
BASOPHILS ABSOLUTE: 0 K/CU MM
BASOPHILS RELATIVE PERCENT: 0.1 % (ref 0–1)
BILIRUB SERPL-MCNC: 0.4 MG/DL (ref 0–1)
BILIRUB SERPL-MCNC: 0.4 MG/DL (ref 0–1)
BILIRUBIN DIRECT: 0.2 MG/DL (ref 0–0.3)
BILIRUBIN, INDIRECT: 0.2 MG/DL (ref 0–0.7)
BUN BLDV-MCNC: 13 MG/DL (ref 6–23)
CALCIUM SERPL-MCNC: 7.8 MG/DL (ref 8.3–10.6)
CHLORIDE BLD-SCNC: 101 MMOL/L (ref 99–110)
CO2: 24 MMOL/L (ref 21–32)
CREAT SERPL-MCNC: 1 MG/DL (ref 0.9–1.3)
D DIMER: 209 NG/ML(DDU)
DIFFERENTIAL TYPE: ABNORMAL
EOSINOPHILS ABSOLUTE: 0 K/CU MM
EOSINOPHILS RELATIVE PERCENT: 0 % (ref 0–3)
FERRITIN: 271 NG/ML (ref 30–400)
FIBRINOGEN LEVEL: 389 MG/DL (ref 196.9–442.1)
GFR AFRICAN AMERICAN: >60 ML/MIN/1.73M2
GFR NON-AFRICAN AMERICAN: >60 ML/MIN/1.73M2
GLUCOSE BLD-MCNC: 170 MG/DL (ref 70–99)
HCT VFR BLD CALC: 43.6 % (ref 42–52)
HEMOGLOBIN: 14.5 GM/DL (ref 13.5–18)
HIGH SENSITIVE C-REACTIVE PROTEIN: 65.3 MG/L
IMMATURE NEUTROPHIL %: 0.4 % (ref 0–0.43)
INR BLD: 1.09 INDEX
LACTATE DEHYDROGENASE: 216 IU/L (ref 120–246)
LACTATE: 1.3 MMOL/L (ref 0.4–2)
LEGIONELLA URINARY AG: NEGATIVE
LYMPHOCYTES ABSOLUTE: 0.7 K/CU MM
LYMPHOCYTES RELATIVE PERCENT: 8.8 % (ref 24–44)
MCH RBC QN AUTO: 28.5 PG (ref 27–31)
MCHC RBC AUTO-ENTMCNC: 33.3 % (ref 32–36)
MCV RBC AUTO: 85.7 FL (ref 78–100)
MONOCYTES ABSOLUTE: 0.4 K/CU MM
MONOCYTES RELATIVE PERCENT: 4.3 % (ref 0–4)
NUCLEATED RBC %: 0 %
PDW BLD-RTO: 13.3 % (ref 11.7–14.9)
PLATELET # BLD: 218 K/CU MM (ref 140–440)
PMV BLD AUTO: 9.5 FL (ref 7.5–11.1)
POTASSIUM SERPL-SCNC: 4.3 MMOL/L (ref 3.5–5.1)
PROCALCITONIN: 0.13
PROTHROMBIN TIME: 13.2 SECONDS (ref 11.7–14.5)
RBC # BLD: 5.09 M/CU MM (ref 4.6–6.2)
SEGMENTED NEUTROPHILS ABSOLUTE COUNT: 7 K/CU MM
SEGMENTED NEUTROPHILS RELATIVE PERCENT: 86.4 % (ref 36–66)
SODIUM BLD-SCNC: 133 MMOL/L (ref 135–145)
STREP PNEUMONIAE ANTIGEN: NORMAL
TOTAL IMMATURE NEUTOROPHIL: 0.03 K/CU MM
TOTAL NUCLEATED RBC: 0 K/CU MM
TOTAL PROTEIN: 5.9 GM/DL (ref 6.4–8.2)
TOTAL PROTEIN: 5.9 GM/DL (ref 6.4–8.2)
TROPONIN T: <0.01 NG/ML
VITAMIN D 25-HYDROXY: 37.36 NG/ML
WBC # BLD: 8.1 K/CU MM (ref 4–10.5)

## 2021-01-04 PROCEDURE — 6370000000 HC RX 637 (ALT 250 FOR IP): Performed by: NURSE PRACTITIONER

## 2021-01-04 PROCEDURE — 86141 C-REACTIVE PROTEIN HS: CPT

## 2021-01-04 PROCEDURE — XW13325 TRANSFUSION OF CONVALESCENT PLASMA (NONAUTOLOGOUS) INTO PERIPHERAL VEIN, PERCUTANEOUS APPROACH, NEW TECHNOLOGY GROUP 5: ICD-10-PCS | Performed by: ORTHOPAEDIC SURGERY

## 2021-01-04 PROCEDURE — 2580000003 HC RX 258: Performed by: NURSE PRACTITIONER

## 2021-01-04 PROCEDURE — 87205 SMEAR GRAM STAIN: CPT

## 2021-01-04 PROCEDURE — 86905 BLOOD TYPING RBC ANTIGENS: CPT

## 2021-01-04 PROCEDURE — 84484 ASSAY OF TROPONIN QUANT: CPT

## 2021-01-04 PROCEDURE — 36430 TRANSFUSION BLD/BLD COMPNT: CPT

## 2021-01-04 PROCEDURE — 94640 AIRWAY INHALATION TREATMENT: CPT

## 2021-01-04 PROCEDURE — XW033E5 INTRODUCTION OF REMDESIVIR ANTI-INFECTIVE INTO PERIPHERAL VEIN, PERCUTANEOUS APPROACH, NEW TECHNOLOGY GROUP 5: ICD-10-PCS | Performed by: ORTHOPAEDIC SURGERY

## 2021-01-04 PROCEDURE — 85610 PROTHROMBIN TIME: CPT

## 2021-01-04 PROCEDURE — 94761 N-INVAS EAR/PLS OXIMETRY MLT: CPT

## 2021-01-04 PROCEDURE — 2700000000 HC OXYGEN THERAPY PER DAY

## 2021-01-04 PROCEDURE — 85730 THROMBOPLASTIN TIME PARTIAL: CPT

## 2021-01-04 PROCEDURE — 83605 ASSAY OF LACTIC ACID: CPT

## 2021-01-04 PROCEDURE — 80048 BASIC METABOLIC PNL TOTAL CA: CPT

## 2021-01-04 PROCEDURE — 80076 HEPATIC FUNCTION PANEL: CPT

## 2021-01-04 PROCEDURE — 2580000003 HC RX 258: Performed by: INTERNAL MEDICINE

## 2021-01-04 PROCEDURE — 86901 BLOOD TYPING SEROLOGIC RH(D): CPT

## 2021-01-04 PROCEDURE — 82248 BILIRUBIN DIRECT: CPT

## 2021-01-04 PROCEDURE — 85379 FIBRIN DEGRADATION QUANT: CPT

## 2021-01-04 PROCEDURE — 6360000002 HC RX W HCPCS: Performed by: HOSPITALIST

## 2021-01-04 PROCEDURE — 6360000002 HC RX W HCPCS: Performed by: NURSE PRACTITIONER

## 2021-01-04 PROCEDURE — 85384 FIBRINOGEN ACTIVITY: CPT

## 2021-01-04 PROCEDURE — 2500000003 HC RX 250 WO HCPCS: Performed by: INTERNAL MEDICINE

## 2021-01-04 PROCEDURE — 84145 PROCALCITONIN (PCT): CPT

## 2021-01-04 PROCEDURE — 83615 LACTATE (LD) (LDH) ENZYME: CPT

## 2021-01-04 PROCEDURE — 82728 ASSAY OF FERRITIN: CPT

## 2021-01-04 PROCEDURE — 86900 BLOOD TYPING SEROLOGIC ABO: CPT

## 2021-01-04 PROCEDURE — 80053 COMPREHEN METABOLIC PANEL: CPT

## 2021-01-04 PROCEDURE — 82306 VITAMIN D 25 HYDROXY: CPT

## 2021-01-04 PROCEDURE — 87070 CULTURE OTHR SPECIMN AEROBIC: CPT

## 2021-01-04 PROCEDURE — 2500000003 HC RX 250 WO HCPCS: Performed by: NURSE PRACTITIONER

## 2021-01-04 PROCEDURE — 86850 RBC ANTIBODY SCREEN: CPT

## 2021-01-04 PROCEDURE — 1200000000 HC SEMI PRIVATE

## 2021-01-04 PROCEDURE — 93010 ELECTROCARDIOGRAM REPORT: CPT | Performed by: INTERNAL MEDICINE

## 2021-01-04 PROCEDURE — 85025 COMPLETE CBC W/AUTO DIFF WBC: CPT

## 2021-01-04 PROCEDURE — 86870 RBC ANTIBODY IDENTIFICATION: CPT

## 2021-01-04 RX ORDER — 0.9 % SODIUM CHLORIDE 0.9 %
30 INTRAVENOUS SOLUTION INTRAVENOUS PRN
Status: DISCONTINUED | OUTPATIENT
Start: 2021-01-04 | End: 2021-01-06

## 2021-01-04 RX ORDER — DEXAMETHASONE 4 MG/1
6 TABLET ORAL DAILY
Status: DISCONTINUED | OUTPATIENT
Start: 2021-01-04 | End: 2021-01-08 | Stop reason: HOSPADM

## 2021-01-04 RX ADMIN — SODIUM CHLORIDE, PRESERVATIVE FREE 10 ML: 5 INJECTION INTRAVENOUS at 09:29

## 2021-01-04 RX ADMIN — DEXAMETHASONE 6 MG: 4 TABLET ORAL at 09:28

## 2021-01-04 RX ADMIN — BUDESONIDE AND FORMOTEROL FUMARATE DIHYDRATE 2 PUFF: 160; 4.5 AEROSOL RESPIRATORY (INHALATION) at 20:32

## 2021-01-04 RX ADMIN — CALCIUM GLUCONATE 1 G: 20 INJECTION, SOLUTION INTRAVENOUS at 00:00

## 2021-01-04 RX ADMIN — CALCIUM 500 MG: 500 TABLET ORAL at 09:28

## 2021-01-04 RX ADMIN — MULTIPLE VITAMINS W/ MINERALS TAB 1 TABLET: TAB at 09:28

## 2021-01-04 RX ADMIN — REMDESIVIR 200 MG: 100 INJECTION, POWDER, LYOPHILIZED, FOR SOLUTION INTRAVENOUS at 14:10

## 2021-01-04 RX ADMIN — ENOXAPARIN SODIUM 40 MG: 40 INJECTION SUBCUTANEOUS at 20:58

## 2021-01-04 RX ADMIN — STANDARDIZED SENNA CONCENTRATE 8.6 MG: 8.6 TABLET ORAL at 20:58

## 2021-01-04 RX ADMIN — Medication 2000 UNITS: at 09:28

## 2021-01-04 RX ADMIN — ALBUTEROL SULFATE 2 PUFF: 90 AEROSOL, METERED RESPIRATORY (INHALATION) at 11:20

## 2021-01-04 RX ADMIN — STANDARDIZED SENNA CONCENTRATE 8.6 MG: 8.6 TABLET ORAL at 09:28

## 2021-01-04 RX ADMIN — ALBUTEROL SULFATE 2 PUFF: 90 AEROSOL, METERED RESPIRATORY (INHALATION) at 20:31

## 2021-01-04 RX ADMIN — ALBUTEROL SULFATE 2 PUFF: 90 AEROSOL, METERED RESPIRATORY (INHALATION) at 16:15

## 2021-01-04 RX ADMIN — SODIUM CHLORIDE, PRESERVATIVE FREE 10 ML: 5 INJECTION INTRAVENOUS at 20:58

## 2021-01-04 RX ADMIN — MONTELUKAST 10 MG: 10 TABLET, FILM COATED ORAL at 09:28

## 2021-01-04 RX ADMIN — ENOXAPARIN SODIUM 30 MG: 30 INJECTION SUBCUTANEOUS at 00:00

## 2021-01-04 RX ADMIN — MAGNESIUM OXIDE 400 MG (241.3 MG MAGNESIUM) TABLET 200 MG: TABLET at 09:29

## 2021-01-04 RX ADMIN — BUDESONIDE AND FORMOTEROL FUMARATE DIHYDRATE 2 PUFF: 160; 4.5 AEROSOL RESPIRATORY (INHALATION) at 07:35

## 2021-01-04 RX ADMIN — ENOXAPARIN SODIUM 30 MG: 30 INJECTION SUBCUTANEOUS at 09:29

## 2021-01-04 NOTE — CARE COORDINATION
Reviewed chart and attempted to call pt in room but he was not able to hear over the phone, his nurse Rox Prado got permission for CM to call his Significant other luana. I called Emile Mcclain and discussed discharge plan. Pt lives his her, PTA he was totally independent , she is able to assist him as needed. She has a PCP and insurance that covers medications. She is not sure if he will need HC at discharge but is open to it if needed. She states plan will be for pt to return home at discharge . CM will continue to follow for needs.

## 2021-01-04 NOTE — FLOWSHEET NOTE
Admission skin assessment- per oscar quiroz and juan rn charge. Pt does not have any skin break down.

## 2021-01-04 NOTE — H&P
HISTORY AND PHYSICAL  (Hospitalist, Internal Medicine)  IDENTIFYING INFORMATION   PATIENT:  Timi Cabrera  MRN:  [id number]  ADMIT DATE: 1/3/2021  TIME OF EVALUATION: 1/3/2021 10:41 PM    CHIEF COMPLAINT   Cough, S OB, sore throat, body aches, sinus pressure. HISTORY OF PRESENT ILLNESS   Timi Cabrera is a 72 y.o. male with a past medical history of asthma, hypertension and chronic back pain. He presents as a direct admit from [facility] ER. The patient presented to the ER with complaints of 3 days history of viral symptoms of cough, S OB, tachycardia anemia, sore throat, diffuse body aches and sinus pressure. He was hypoxic on presentation requiring 3 L of supplemental oxygen which kept O2 sats to 94%. He tested positive for COVID-19 virus. Chest x-ray nonacute. Lab work significant for sodium of 128 chloride of 93 and calcium of 8.1. Patient was also noted to be tachycardic with heart rate above 100 otherwise afebrile and normotensive. He received IV fluids and Zofran at the ER. Patient [facility] for further management. PMH listed below: Reviewed    PAST MEDICAL, SURGICAL, FAMILY, and SOCIAL HISTORY     Past Medical History:   Diagnosis Date    Asthma     Chronic back pain     echocardiogram 06/10/2020    EF 55-60% mild MR TR    Family history of coronary artery disease     Hearing loss     History of Doppler ultrasound 07/20/2018    No significant venous insufficiency, No DVT or SVT    History of echocardiogram 07/20/2018    EF 55-60%, No significant valvular disease, no pericardial effusion, Mild PHTN at 42 mmHg.  History of left heart catheterization 08/09/2018    NL.     History of nuclear stress test 07/11/2018    EF 62%    Hypertension     SOB (shortness of breath)      Past Surgical History:   Procedure Laterality Date    CHOLECYSTECTOMY, LAPAROSCOPIC N/A 11/2/2020    CHOLECYSTECTOMY LAPAROSCOPIC performed by Didier Mathews MD at [address] [place] [address] Rd  2005 Take 1 tablet by mouth daily  magnesium (MAGNESIUM-OXIDE) 250 MG TABS tablet, Take 250 mg by mouth daily  albuterol sulfate HFA (PROAIR HFA) 108 (90 Base) MCG/ACT inhaler, Inhale 2 puffs into the lungs every 6 hours as needed for Wheezing  montelukast (SINGULAIR) 10 MG tablet, Take 1 tablet by mouth daily    Current Medications  Current Facility-Administered Medications   Medication Dose Route Frequency Provider Last Rate Last Admin    albuterol sulfate  (90 Base) MCG/ACT inhaler 2 puff  2 puff Inhalation Q6H PRN JACOB Miles CNP        [START ON 1/4/2021] calcium carbonate tablet 600 mg  1 tablet Oral Daily JACOB Miles CNP        fluticasone-salmeterol (ADVAIR) 250-50 MCG/DOSE AEPB 1 puff  1 puff Inhalation Q12H JACOB Miles CNP        [START ON 1/4/2021] magnesium (MAGNESIUM-OXIDE) tablet 250 mg  250 mg Oral Daily JACOB Miles CNP        [START ON 1/4/2021] montelukast (SINGULAIR) tablet 10 mg  10 mg Oral Daily JACOB Miles CNP        [START ON 1/4/2021] Centrum Silver 50+Men TABS 1 tablet  1 tablet Oral Daily JACOB Miles CNP        senna (SENOKOT) tablet 8.6 mg  1 tablet Oral BID JACOB Miles CNP        sodium chloride flush 0.9 % injection 10 mL  10 mL Intravenous 2 times per day JACOB Miles CNP        sodium chloride flush 0.9 % injection 10 mL  10 mL Intravenous PRN JACBO Miles CNP        enoxaparin (LOVENOX) injection 30 mg  30 mg Subcutaneous BID JACOB Miles CNP        promethazine (PHENERGAN) tablet 12.5 mg  12.5 mg Oral Q6H PRN JACOB Miles CNP        Or    ondansetron (ZOFRAN) injection 4 mg  4 mg Intravenous Q6H PRN JACOB Miles CNP        polyethylene glycol (GLYCOLAX) packet 17 g  17 g Oral Daily PRN Clara Dapilah, APRN - CNP        acetaminophen (TYLENOL) tablet 650 mg  650 mg Oral Q6H PRN JACOB Miles - CNP        Or    acetaminophen (TYLENOL) suppository 650 mg  650 mg Rectal Q6H PRN Clara Mo, APRN - CNP        guaiFENesin-dextromethorphan (ROBITUSSIN DM) 100-10 MG/5ML syrup 5 mL  5 mL Oral Q4H PRN Clara Mo, APRN - CNP        [START ON 1/4/2021] Vitamin D (CHOLECALCIFEROL) tablet 2,000 Units  2,000 Units Oral Daily Clara Mo, APRN - CNP        0.9 % sodium chloride infusion   Intravenous PRN Clara Mo, APRN - CNP             Allergies  Allergies   Allergen Reactions    Prednisone Other (See Comments)     BURNING IN THROAT    Ciclopirox Olamine      Blister really bad        REVIEW OF SYSTEMS   Within above limitations. 14 point review of systems reviewed. Pertinent positive or negative as per HPI or otherwise negative per 14 point systems review. PHYSICAL EXAM     Wt Readings from Last 3 Encounters:   01/03/21 251 lb 6.4 oz (114 kg)   11/17/20 248 lb 12.8 oz (112.9 kg)   11/02/20 240 lb (108.9 kg)       Blood pressure (!) 119/58, pulse 96, temperature 98.3 °F (36.8 °C), temperature source Oral, resp. rate 18, height 5' 10\" (1.778 m), weight 251 lb 6.4 oz (114 kg), SpO2 92 %. General - AAO x 3  Psych - Appropriate affect/speech. No agitation  Eyes - Eye lids intact. No scleral icterus  ENT - Lips wnl. External ear clear/dry/intact. No thyromegaly on inspection  Neuro - No gross peripheral or central neuro deficits on inspection  Heart - Sinus. RRR. S1 and S2 present. No added HS/murmurs appreciated. No elevated JVD appreciated  Lung - Adequate air entry b/l, No crackles/wheezes appreciated  GI - Soft. No guarding/rigidity. No hepatosplenomegaly/ascites. BS+   - No CVA/suprapubic tenderness or palpable bladder distension  Skin - Intact. No rash/petechiae/ecchymosis. Warm extremities  MSK - Joints with normal ROM.  No joint swellings      LABS AND IMAGING   CBC  [unfilled]    Last 3 Hemoglobin  Lab Results   Component Value Date    HGB 14.7 01/03/2021    HGB 14.0 11/03/2020    HGB 16.0 11/02/2020     Last 3 WBC/ANC  Lab Results   Component Value Date    WBC 9.8 01/03/2021    WBC 18.8 11/03/2020    WBC 15.4 11/02/2020     No components found for: GRNLOCTYABS  Last 3 Platelets  No results found for: PLATELET  Chemistry  [unfilled]  [unfilled]  No results found for: LDH  Coagulation Studies  Lab Results   Component Value Date    INR 0.96 08/07/2018     Liver Function Studies  Lab Results   Component Value Date    ALT 22 01/03/2021    AST 35 01/03/2021    ALKPHOS 63 01/03/2021       Recent Imaging    EXAMINATION:   ONE XRAY VIEW OF THE CHEST       1/3/2021 5:21 pm       COMPARISON:   Chest x-ray November 2, 2020       HISTORY:   ORDERING SYSTEM PROVIDED HISTORY: cough, hypoxia   TECHNOLOGIST PROVIDED HISTORY:   Reason for exam:->cough, hypoxia       FINDINGS:   Cardiac silhouette is stable.  Chronic interstitial changes of the lungs   which are not significantly changed when compared with exam from November 2, 2020.  No focal consolidation, pleural effusion, or pneumothorax identified. Osseous structures appear intact.           Impression   1. No acute cardiopulmonary process identified.           Relevant labs and imaging reviewed    ASSESSMENT AND PLAN     Mr. Aquilino Nails is a 70-year-old male who presents with the following;    Acute hypoxic respiratory failure 2/2 Covid-19 virus infection. Required 3 L supplemental oxygen on presentation which kept O2 sats to 94%. Questionable asthma exacerbation.  -Chest x-ray nonacute  -Tested Covid + on  1/3/2021  -Admit to Covid MedSurg unit.  -Continue supplemental oxygen as needed. -Type and screen for 2 units convalescent plasma.  -Consult pharmacy for possible remdesivir.  -Breathing treatments as needed. -Steroids  -Patient tachycardic and tachypneic but afebrile and with negative chest x-ray. Will hold off antibiotics for now.  Procalcitonin pending.  -Respiratory PCR, sputum culture, Legionella and strep antigen pending.  -Daily routine COVID-19 labs

## 2021-01-04 NOTE — ED NOTES
Dr. Amina Yeh spoke with Dr. Antoinette Vizcarra for admission.       Pepper Garcia 139, RN  01/03/21 2988

## 2021-01-04 NOTE — ED PROVIDER NOTES
[address]  eMERGENCY dEPARTMENT eNCOUnter      Pt Name: Perri Vera  MRN: [id number]  [place] 1955  Date of evaluation: 1/3/2021  Provider: Makayla Pelletier MD    [address] [place] [address]       Chief Complaint   Patient presents with    URI         HISTORY OF PRESENT ILLNESS   (Location/Symptom, Timing/Onset, Context/Setting, Quality, Duration, Modifying Factors, Severity)  Note limiting factors. Perri Vera is a 72 y.o. male with history of asthma who presents with 3 days of diffuse viral symptoms including cough, shortness of breath, increased work of breathing, sore throat, diffuse body aches, and sinus pressure. Patient reports his symptoms are moderate, constant, and worsening. He reports exertion worsens the symptoms and nothing improves them. HPI    Nursing Notes were reviewed. REVIEW OFSYSTEMS    (2-9 systems for level 4, 10 or more for level 5)     Review of Systems   Constitutional: Positive for chills and fatigue. Negative for appetite change, fever and unexpected weight change. HENT: Positive for sinus pressure and sore throat. Negative for facial swelling, trouble swallowing and voice change. Eyes: Negative for photophobia and visual disturbance. Respiratory: Positive for cough, chest tightness and shortness of breath. Negative for wheezing and stridor. Cardiovascular: Negative for palpitations and leg swelling. Gastrointestinal: Positive for nausea and vomiting. Negative for abdominal pain and blood in stool. Genitourinary: Negative for difficulty urinating and dysuria. Musculoskeletal: Negative for back pain, gait problem and neck pain. Skin: Negative for color change and wound. Neurological: Negative for seizures, syncope and speech difficulty. Psychiatric/Behavioral: Negative for self-injury and suicidal ideas. Except as noted above the remainder of the review of systems was reviewed and negative.        PAST MEDICAL HISTORY Past Medical History:   Diagnosis Date    Asthma     Chronic back pain     echocardiogram 06/10/2020    EF 55-60% mild MR TR    Family history of coronary artery disease     Hearing loss     History of Doppler ultrasound 07/20/2018    No significant venous insufficiency, No DVT or SVT    History of echocardiogram 07/20/2018    EF 55-60%, No significant valvular disease, no pericardial effusion, Mild PHTN at 42 mmHg.  History of left heart catheterization 08/09/2018    NL.     History of nuclear stress test 07/11/2018    EF 62%    Hypertension     SOB (shortness of breath)          SURGICAL HISTORY       Past Surgical History:   Procedure Laterality Date    CHOLECYSTECTOMY, LAPAROSCOPIC N/A 11/2/2020    CHOLECYSTECTOMY LAPAROSCOPIC performed by Robinson Pate MD at [address]  2005    polyp removed Dr Porfirio Willams       Previous Medications    ALBUTEROL SULFATE HFA (PROAIR HFA) 108 (90 BASE) MCG/ACT INHALER    Inhale 2 puffs into the lungs every 6 hours as needed for Wheezing    CALCIUM CARBONATE 600 MG TABS TABLET    Take 1 tablet by mouth daily    FLUTICASONE-SALMETEROL (ADVAIR) 250-50 MCG/DOSE AEPB    Inhale 1 puff into the lungs every 12 hours    MAGNESIUM (MAGNESIUM-OXIDE) 250 MG TABS TABLET    Take 250 mg by mouth daily    MONTELUKAST (SINGULAIR) 10 MG TABLET    Take 1 tablet by mouth daily    MULTIPLE VITAMINS-MINERALS (CENTRUM SILVER 50+MEN) TABS    Take 1 tablet by mouth daily    SENNA (SENOKOT) 8.6 MG TABLET    Take 1 tablet by mouth 2 times daily       ALLERGIES     Prednisone and Ciclopirox olamine    FAMILY HISTORY       Family History   Problem Relation Age of Onset    High Blood Pressure Mother     Osteoporosis Mother     Heart Disease Father     Colon Cancer Brother           SOCIAL HISTORY       Social History     Socioeconomic History    Marital status:      Spouse name: Not on file    Number of children: Not on file    Years of education: Not on file    Highest education level: Not on file   Occupational History    Not on file   Social Needs    Financial resource strain: Not on file    Food insecurity     Worry: Not on file     Inability: Not on file    Transportation needs     Medical: Not on file     Non-medical: Not on file   Tobacco Use    Smoking status: Former Smoker    Smokeless tobacco: Never Used    Tobacco comment: See Soft Chart    Substance and Sexual Activity    Alcohol use: No     Comment: Occasionally    Drug use: No    Sexual activity: Yes     Partners: Female   Lifestyle    Physical activity     Days per week: Not on file     Minutes per session: Not on file    Stress: Not on file   Relationships    Social connections     Talks on phone: Not on file     Gets together: Not on file     Attends Hoahaoism service: Not on file     Active member of club or organization: Not on file     Attends meetings of clubs or organizations: Not on file     Relationship status: Not on file    Intimate partner violence     Fear of current or ex partner: Not on file     Emotionally abused: Not on file     Physically abused: Not on file     Forced sexual activity: Not on file   Other Topics Concern    Not on file   Social History Narrative    Not on file         PHYSICAL EXAM    (up to 7 for level 4, 8 or more for level 5)     ED Triage Vitals   BP Temp Temp Source Pulse Resp SpO2 Height Weight   01/03/21 1707 01/03/21 1658 01/03/21 1658 01/03/21 1658 01/03/21 1658 01/03/21 1658 01/03/21 1658 01/03/21 1658   119/81 97.5 °F (36.4 °C) Oral 107 22 (!) 89 % 5' 10\" (1.778 m) 250 lb (113.4 kg)       Physical Exam  Vitals signs and nursing note reviewed. Constitutional:       General: He is not in acute distress. Appearance: He is well-developed. He is diaphoretic. Comments: Patient very hard of hearing   HENT:      Head: Normocephalic and atraumatic.       Right Protein, UA TRACE (*)     All other components within normal limits   RAPID INFLUENZA A/B ANTIGENS   STREP SCREEN GROUP A THROAT    Narrative:     SETUP DATE/TIME:     CULTURE, BLOOD 1   CULTURE, BLOOD 2   LACTIC ACID, PLASMA   TROPONIN   BRAIN NATRIURETIC PEPTIDE       All otherlabs were within normal range or not returned as of this dictation. EMERGENCY DEPARTMENT COURSE and DIFFERENTIAL DIAGNOSIS/MDM:   Vitals:    Vitals:    01/03/21 1801 01/03/21 1900 01/03/21 2000 01/03/21 2030   BP: 127/69 (!) 128/90 118/85 131/84   Pulse: 106 105 104 107   Resp: (!) 34 (!) 32 22 23   Temp:       TempSrc:       SpO2: 92% 92% 92% 91%   Weight:       Height:             MDM  Patient is in acute hypoxic respiratory failure on arrival requiring 3 L of oxygen. He does keep his oxygen saturation at 94% on 3 L. He is tachypneic, tachycardic, is hemodynamically stable with no hypotension. Patient is given IV fluids with mild improvement in pulse. I have considered pulmonary embolus however the patient reports multiple viral symptoms such as sore throat, diffuse body aches, sinus pressure, and congestion preceding his shortness of breath and I feel this is far more consistent with COVID-19 or other viral causes of hypoxia than pulmonary embolism however pulmonary embolism is not excluded during his ED visit. Laboratory evaluation is unremarkable with no acute signs of endorgan damage. Patient is given IV fluids, Zofran for his nausea, and Decadron for his presumed COVID-19 and is admitted to [facility]. COVID-19 test does result positive during his stay in the emergency department. [facility] is aware of positive result patient will be admitted to the COVID-19 unit given his hypoxia.   The patient is made aware of his positive COVID-19 test.  The patient is reassessed multiple times understand emergency department with continued cough and shortness of breath but no acute worsening of his clinical status. CONSULTS:  IP CONSULT TO HOSPITALIST    PROCEDURES:  Unless otherwise noted below, none     Critical Care  Performed by: Jaret Gonzalez MD  Authorized by: Jaret Gonzalez MD     Critical care provider statement:     Critical care time (minutes):  35    Critical care time was exclusive of:  Separately billable procedures and treating other patients and teaching time    Critical care was necessary to treat or prevent imminent or life-threatening deterioration of the following conditions:  Respiratory failure, shock and circulatory failure    Critical care was time spent personally by me on the following activities:  Ordering and performing treatments and interventions, development of treatment plan with patient or surrogate, ordering and review of laboratory studies, discussions with consultants, ordering and review of radiographic studies, pulse oximetry, evaluation of patient's response to treatment, re-evaluation of patient's condition, examination of patient, obtaining history from patient or surrogate and review of old charts        FINAL IMPRESSION      1. COVID-19    2. Acute respiratory failure with hypoxia [facility])          DISPOSITION/PLAN   DISPOSITION Decision To Admit 01/03/2021 07:06:23 PM           (Please note that portions of this note were completed with a voice recognition program.  Efforts were made to edit the dictations but occasionally words aremis-transcribed. )    Jaret Gonzalez MD (electronically signed)  Attending Emergency Physician            Jaret Gonzalez MD  01/03/21 4007

## 2021-01-04 NOTE — ED NOTES
Report given to [facility] Jena Romero RN  01/03/21 2108       Neville Rudd RN  01/03/21 2110

## 2021-01-04 NOTE — PROGRESS NOTES
[facility] HOSPITALIST PROGRESS NOTE  Date: 1/4/2021   Name: Winston Vieira   MRN: [id number]   YOB: 1955  Chief Complaint   Patient presents with    URI        Subjective/Interval Hx:     Patient states that he has been having some nausea, not complaining of any shortness of breath    ROS: negative unless mentioned above  Objective:   Physical Exam:   /65   Pulse 84   Temp 98.1 °F (36.7 °C) (Oral)   Resp 18   Ht 5' 10\" (1.778 m)   Wt 250 lb 11.2 oz (113.7 kg)   SpO2 92%   BMI 35.97 kg/m²   CONSTITUTIONAL:  No acute distress  EYES:  No discharge  HENT:  NCAT  LUNGS:  cta b/l bs+  CARDIOVASCULAR:  s1s2 rrr  ABDOMEN:  Soft nt nd   MUSCULOSKELETAL/Extremities:  No edema, nontender  NEUROLOGIC:  No gross deficits, aao  SKIN:  No gross lesions    Assessment/Plan:     1. Acute hypoxic respiratory failure 2/2 Covid-19 virus infection.    -On 3 L nasal cannula to keep sats to 94%. CXR: Nonacute. Convalescent plasma ordered. Remdesivir and steroids ordered. -CRP 65. On vitamin D. Increased Lovenox based on patient's BMI. 2. Hyponatremia.   -Sodium 128. Likely due to dehydration from Covid 18. On normal saline.  -Improved sodium to 133.        History of asthma  Chronic back pain  Hypertension       DVT Prophylaxis: Lovenox  Diet: DIET CARDIAC;  Code Status: Full Code      Dispo:  -Need breathing to improve.     Asuncion Louie MD  1/4/2021    Meds:   Meds:    remdesivir IVPB  200 mg Intravenous Once    Followed by   [place] [facility] ON 1/5/2021] remdesivir IVPB  100 mg Intravenous Q24H    calcium elemental  500 mg Oral Daily    magnesium oxide  200 mg Oral Daily    montelukast  10 mg Oral Daily    therapeutic multivitamin-minerals  1 tablet Oral Daily    senna  1 tablet Oral BID    sodium chloride flush  10 mL Intravenous 2 times per day    enoxaparin  30 mg Subcutaneous BID    Vitamin D  2,000 Units Oral Daily    influenza virus vaccine  0.5 mL Intramuscular Prior to discharge   

## 2021-01-05 ENCOUNTER — APPOINTMENT (OUTPATIENT)
Dept: GENERAL RADIOLOGY | Age: 66
DRG: 177 | End: 2021-01-05
Payer: MEDICARE

## 2021-01-05 LAB
ALBUMIN SERPL-MCNC: 3.6 GM/DL (ref 3.4–5)
ALBUMIN SERPL-MCNC: 3.6 GM/DL (ref 3.4–5)
ALP BLD-CCNC: 55 IU/L (ref 40–129)
ALP BLD-CCNC: 55 IU/L (ref 40–129)
ALT SERPL-CCNC: 26 U/L (ref 10–40)
ALT SERPL-CCNC: 26 U/L (ref 10–40)
ANION GAP SERPL CALCULATED.3IONS-SCNC: 11 MMOL/L (ref 4–16)
APTT: 38.6 SECONDS (ref 25.1–37.1)
AST SERPL-CCNC: 35 IU/L (ref 15–37)
AST SERPL-CCNC: 35 IU/L (ref 15–37)
BASE EXCESS MIXED: 3.4 (ref 0–1.2)
BASOPHILS ABSOLUTE: 0 K/CU MM
BASOPHILS RELATIVE PERCENT: 0.1 % (ref 0–1)
BILIRUB SERPL-MCNC: 0.5 MG/DL (ref 0–1)
BILIRUB SERPL-MCNC: 0.5 MG/DL (ref 0–1)
BILIRUBIN DIRECT: 0.2 MG/DL (ref 0–0.3)
BILIRUBIN, INDIRECT: 0.3 MG/DL (ref 0–0.7)
BUN BLDV-MCNC: 14 MG/DL (ref 6–23)
CALCIUM SERPL-MCNC: 8.1 MG/DL (ref 8.3–10.6)
CARBON MONOXIDE, BLOOD: 1.4 % (ref 0–5)
CHLORIDE BLD-SCNC: 92 MMOL/L (ref 99–110)
CO2 CONTENT: 27.9 MMOL/L (ref 19–24)
CO2: 26 MMOL/L (ref 21–32)
COMMENT: ABNORMAL
CREAT SERPL-MCNC: 1.2 MG/DL (ref 0.9–1.3)
D DIMER: <200 NG/ML(DDU)
DIFFERENTIAL TYPE: ABNORMAL
EOSINOPHILS ABSOLUTE: 0 K/CU MM
EOSINOPHILS RELATIVE PERCENT: 0 % (ref 0–3)
FIBRINOGEN LEVEL: 374 MG/DL (ref 196.9–442.1)
GFR AFRICAN AMERICAN: >60 ML/MIN/1.73M2
GFR NON-AFRICAN AMERICAN: >60 ML/MIN/1.73M2
GLUCOSE BLD-MCNC: 92 MG/DL (ref 70–99)
HCO3 ARTERIAL: 26.8 MMOL/L (ref 18–23)
HCT VFR BLD CALC: 42.7 % (ref 42–52)
HEMOGLOBIN: 14.2 GM/DL (ref 13.5–18)
IMMATURE NEUTROPHIL %: 0.4 % (ref 0–0.43)
INR BLD: 1.08 INDEX
LYMPHOCYTES ABSOLUTE: 1.7 K/CU MM
LYMPHOCYTES RELATIVE PERCENT: 13 % (ref 24–44)
MAGNESIUM: 2 MG/DL (ref 1.8–2.4)
MCH RBC QN AUTO: 28.7 PG (ref 27–31)
MCHC RBC AUTO-ENTMCNC: 33.3 % (ref 32–36)
MCV RBC AUTO: 86.4 FL (ref 78–100)
METHEMOGLOBIN ARTERIAL: 1.2 %
MONOCYTES ABSOLUTE: 1.1 K/CU MM
MONOCYTES RELATIVE PERCENT: 8.2 % (ref 0–4)
NUCLEATED RBC %: 0 %
O2 SATURATION: 90.5 % (ref 96–97)
PCO2 ARTERIAL: 36 MMHG (ref 32–45)
PDW BLD-RTO: 13.7 % (ref 11.7–14.9)
PH BLOOD: 7.48 (ref 7.34–7.45)
PLATELET # BLD: 238 K/CU MM (ref 140–440)
PMV BLD AUTO: 10.2 FL (ref 7.5–11.1)
PO2 ARTERIAL: 62 MMHG (ref 75–100)
POTASSIUM SERPL-SCNC: 3.4 MMOL/L (ref 3.5–5.1)
PROTHROMBIN TIME: 13.1 SECONDS (ref 11.7–14.5)
RBC # BLD: 4.94 M/CU MM (ref 4.6–6.2)
SARS-COV-2: DETECTED
SEGMENTED NEUTROPHILS ABSOLUTE COUNT: 10 K/CU MM
SEGMENTED NEUTROPHILS RELATIVE PERCENT: 78.3 % (ref 36–66)
SODIUM BLD-SCNC: 129 MMOL/L (ref 135–145)
SOURCE: ABNORMAL
TOTAL IMMATURE NEUTOROPHIL: 0.05 K/CU MM
TOTAL NUCLEATED RBC: 0 K/CU MM
TOTAL PROTEIN: 6.1 GM/DL (ref 6.4–8.2)
TOTAL PROTEIN: 6.1 GM/DL (ref 6.4–8.2)
WBC # BLD: 12.8 K/CU MM (ref 4–10.5)

## 2021-01-05 PROCEDURE — 36415 COLL VENOUS BLD VENIPUNCTURE: CPT

## 2021-01-05 PROCEDURE — 85384 FIBRINOGEN ACTIVITY: CPT

## 2021-01-05 PROCEDURE — 6370000000 HC RX 637 (ALT 250 FOR IP): Performed by: PHYSICIAN ASSISTANT

## 2021-01-05 PROCEDURE — 94761 N-INVAS EAR/PLS OXIMETRY MLT: CPT

## 2021-01-05 PROCEDURE — 36600 WITHDRAWAL OF ARTERIAL BLOOD: CPT

## 2021-01-05 PROCEDURE — 85730 THROMBOPLASTIN TIME PARTIAL: CPT

## 2021-01-05 PROCEDURE — 85025 COMPLETE CBC W/AUTO DIFF WBC: CPT

## 2021-01-05 PROCEDURE — 1200000000 HC SEMI PRIVATE

## 2021-01-05 PROCEDURE — 82248 BILIRUBIN DIRECT: CPT

## 2021-01-05 PROCEDURE — 85610 PROTHROMBIN TIME: CPT

## 2021-01-05 PROCEDURE — 6360000002 HC RX W HCPCS: Performed by: HOSPITALIST

## 2021-01-05 PROCEDURE — 85379 FIBRIN DEGRADATION QUANT: CPT

## 2021-01-05 PROCEDURE — 2500000003 HC RX 250 WO HCPCS: Performed by: INTERNAL MEDICINE

## 2021-01-05 PROCEDURE — 36430 TRANSFUSION BLD/BLD COMPNT: CPT

## 2021-01-05 PROCEDURE — 2580000003 HC RX 258: Performed by: NURSE PRACTITIONER

## 2021-01-05 PROCEDURE — 2700000000 HC OXYGEN THERAPY PER DAY

## 2021-01-05 PROCEDURE — 2580000003 HC RX 258: Performed by: INTERNAL MEDICINE

## 2021-01-05 PROCEDURE — 83735 ASSAY OF MAGNESIUM: CPT

## 2021-01-05 PROCEDURE — 6370000000 HC RX 637 (ALT 250 FOR IP): Performed by: NURSE PRACTITIONER

## 2021-01-05 PROCEDURE — 82803 BLOOD GASES ANY COMBINATION: CPT

## 2021-01-05 PROCEDURE — 94640 AIRWAY INHALATION TREATMENT: CPT

## 2021-01-05 PROCEDURE — 80048 BASIC METABOLIC PNL TOTAL CA: CPT

## 2021-01-05 PROCEDURE — 80053 COMPREHEN METABOLIC PANEL: CPT

## 2021-01-05 PROCEDURE — 71045 X-RAY EXAM CHEST 1 VIEW: CPT

## 2021-01-05 RX ORDER — BENZONATATE 100 MG/1
200 CAPSULE ORAL 3 TIMES DAILY PRN
Status: DISCONTINUED | OUTPATIENT
Start: 2021-01-05 | End: 2021-01-08 | Stop reason: HOSPADM

## 2021-01-05 RX ADMIN — STANDARDIZED SENNA CONCENTRATE 8.6 MG: 8.6 TABLET ORAL at 21:22

## 2021-01-05 RX ADMIN — ALBUTEROL SULFATE 2 PUFF: 90 AEROSOL, METERED RESPIRATORY (INHALATION) at 05:11

## 2021-01-05 RX ADMIN — SODIUM CHLORIDE, PRESERVATIVE FREE 10 ML: 5 INJECTION INTRAVENOUS at 10:24

## 2021-01-05 RX ADMIN — SODIUM CHLORIDE, PRESERVATIVE FREE 10 ML: 5 INJECTION INTRAVENOUS at 21:23

## 2021-01-05 RX ADMIN — GUAIFENESIN AND DEXTROMETHORPHAN 5 ML: 100; 10 SYRUP ORAL at 01:02

## 2021-01-05 RX ADMIN — MULTIPLE VITAMINS W/ MINERALS TAB 1 TABLET: TAB at 10:23

## 2021-01-05 RX ADMIN — CALCIUM 500 MG: 500 TABLET ORAL at 10:24

## 2021-01-05 RX ADMIN — ENOXAPARIN SODIUM 40 MG: 40 INJECTION SUBCUTANEOUS at 21:23

## 2021-01-05 RX ADMIN — Medication 2000 UNITS: at 10:23

## 2021-01-05 RX ADMIN — MONTELUKAST 10 MG: 10 TABLET, FILM COATED ORAL at 10:24

## 2021-01-05 RX ADMIN — ENOXAPARIN SODIUM 40 MG: 40 INJECTION SUBCUTANEOUS at 10:23

## 2021-01-05 RX ADMIN — BUDESONIDE AND FORMOTEROL FUMARATE DIHYDRATE 2 PUFF: 160; 4.5 AEROSOL RESPIRATORY (INHALATION) at 07:22

## 2021-01-05 RX ADMIN — BENZONATATE 200 MG: 100 CAPSULE ORAL at 22:06

## 2021-01-05 RX ADMIN — REMDESIVIR 100 MG: 100 INJECTION, POWDER, LYOPHILIZED, FOR SOLUTION INTRAVENOUS at 13:25

## 2021-01-05 RX ADMIN — DEXAMETHASONE 6 MG: 4 TABLET ORAL at 10:24

## 2021-01-05 RX ADMIN — STANDARDIZED SENNA CONCENTRATE 8.6 MG: 8.6 TABLET ORAL at 10:23

## 2021-01-05 RX ADMIN — ACETAMINOPHEN 650 MG: 325 TABLET ORAL at 05:22

## 2021-01-05 RX ADMIN — ALBUTEROL SULFATE 2 PUFF: 90 AEROSOL, METERED RESPIRATORY (INHALATION) at 07:21

## 2021-01-05 RX ADMIN — BUDESONIDE AND FORMOTEROL FUMARATE DIHYDRATE 2 PUFF: 160; 4.5 AEROSOL RESPIRATORY (INHALATION) at 19:38

## 2021-01-05 RX ADMIN — MAGNESIUM OXIDE 400 MG (241.3 MG MAGNESIUM) TABLET 200 MG: TABLET at 10:24

## 2021-01-05 ASSESSMENT — PAIN SCALES - GENERAL: PAINLEVEL_OUTOF10: 0

## 2021-01-05 NOTE — PROGRESS NOTES
[facility] PROGRESS NOTE  Date: 1/5/2021   Name: Billy Jansen   MRN: [id number]   YOB: 1955  Chief Complaint   Patient presents with    URI        Subjective/Interval Hx:     He feels his breathing is ok today  He has been having cough  He is eating and drinking  No chest pain    ROS: negative unless mentioned above  Objective:   Physical Exam:   BP (!) 140/70   Pulse 114   Temp 99.9 °F (37.7 °C) (Oral)   Resp 18   Ht 5' 10\" (1.778 m)   Wt 255 lb 1.6 oz (115.7 kg)   SpO2 92%   BMI 36.60 kg/m²   CONSTITUTIONAL:  No acute distress  EYES:  No discharge  HENT:  NCAT  LUNGS:  cta b/l bs+  CARDIOVASCULAR:  s1s2 tachycardic  ABDOMEN:  Soft nt nd   MUSCULOSKELETAL/Extremities:  No edema, nontender  NEUROLOGIC:  No gross deficits, aao  SKIN:  No gross lesions    Assessment/Plan:     1. Acute hypoxic respiratory failure 2/2 Covid-19 virus infection.    -Remdesivir and steroids ordered. -CRP 65. On vitamin D. Increased Lovenox based on patient's BMI. -T-max 102.9. On 5 L nasal cannula. Procalcitonin 0.13. Convalescent plasma 1/4-5. CXR: Diffuse airspace opacities, worsening. Urine Legionella and strep negative. 2. Hyponatremia.   -Sodium 128. Likely due to dehydration from Covid 18. On normal saline.  -Improved sodium to 133.        History of asthma  Chronic back pain  Hypertension       DVT Prophylaxis: Lovenox  Diet: DIET CARDIAC;  Code Status: Full Code   Home O2: none      Dispo:  -Need breathing to improve.     Emily Tang MD  1/5/2021    Meds:   Meds:    remdesivir IVPB  100 mg Intravenous Q24H    dexamethasone  6 mg Oral Daily    enoxaparin  40 mg Subcutaneous BID    calcium elemental  500 mg Oral Daily    magnesium oxide  200 mg Oral Daily    montelukast  10 mg Oral Daily    therapeutic multivitamin-minerals  1 tablet Oral Daily    senna  1 tablet Oral BID    sodium chloride flush  10 mL Intravenous 2 times per day    Vitamin D  2,000 Units Oral Daily    influenza virus vaccine  0.5 mL Intramuscular Prior to discharge    budesonide-formoterol  2 puff Inhalation BID      Infusions:     PRN Meds:     sodium chloride, 30 mL, PRN      albuterol sulfate HFA, 2 puff, Q6H PRN      sodium chloride flush, 10 mL, PRN      promethazine, 12.5 mg, Q6H PRN    Or      ondansetron, 4 mg, Q6H PRN      polyethylene glycol, 17 g, Daily PRN      acetaminophen, 650 mg, Q6H PRN    Or      acetaminophen, 650 mg, Q6H PRN      guaiFENesin-dextromethorphan, 5 mL, Q4H PRN        Data/Labs:     Recent Labs     01/03/21  1710 01/04/21  0345   WBC 9.8 8.1   HGB 14.7 14.5   HCT 44.2 43.6    218      Recent Labs     01/03/21  1710 01/04/21  0345   * 133*   K 4.0 4.3   CL 93* 101   CO2 23 24   BUN 15 13   CREATININE 1.1 1.0     Recent Labs     01/03/21  1710 01/04/21  0345   AST 35 30  30   ALT 22 22  22   BILIDIR  --  0.2   BILITOT 0.5 0.4  0.4   ALKPHOS 63 57  57     Recent Labs     01/04/21  0345   INR 1.09     Recent Labs     01/03/21  1730 01/04/21  0345   TROPONINT <0.010 <0.010     HgBA1c:   Lab Results   Component Value Date    LABA1C 5.8 07/29/2015     CALCIUM:  7.8/24 (01/04 0345)    I/O last 3 completed shifts:   In: 1535.8 [Blood:1535.8]  Out: 1500 [Urine:1500]    Intake/Output Summary (Last 24 hours) at 1/5/2021 0839  Last data filed at 1/5/2021 0348  Gross per 24 hour   Intake 1535.84 ml   Output 1500 ml   Net 35.84 ml

## 2021-01-05 NOTE — PROGRESS NOTES
Oral temp 102.9, SOB. Sats 88% 3 l nc. Increased o2 6 l nc. 2 puffs albuterol inhaler given. Sats still 88%. 650 mg po tylenol given and cool wash closthes placed on body to cool down. Call to respiratory to assess. Messaged hospitalist. Patient up in chair to assist with breathing.

## 2021-01-06 LAB
ALBUMIN SERPL-MCNC: 3.1 GM/DL (ref 3.4–5)
ALBUMIN SERPL-MCNC: 3.1 GM/DL (ref 3.4–5)
ALP BLD-CCNC: 57 IU/L (ref 40–129)
ALP BLD-CCNC: 57 IU/L (ref 40–129)
ALT SERPL-CCNC: 26 U/L (ref 10–40)
ALT SERPL-CCNC: 26 U/L (ref 10–40)
ANION GAP SERPL CALCULATED.3IONS-SCNC: 8 MMOL/L (ref 4–16)
APTT: 45.1 SECONDS (ref 25.1–37.1)
AST SERPL-CCNC: 30 IU/L (ref 15–37)
AST SERPL-CCNC: 30 IU/L (ref 15–37)
BASOPHILS ABSOLUTE: 0 K/CU MM
BASOPHILS RELATIVE PERCENT: 0.1 % (ref 0–1)
BILIRUB SERPL-MCNC: 0.4 MG/DL (ref 0–1)
BILIRUB SERPL-MCNC: 0.4 MG/DL (ref 0–1)
BILIRUBIN DIRECT: 0.2 MG/DL (ref 0–0.3)
BILIRUBIN, INDIRECT: 0.2 MG/DL (ref 0–0.7)
BUN BLDV-MCNC: 14 MG/DL (ref 6–23)
CALCIUM SERPL-MCNC: 8.1 MG/DL (ref 8.3–10.6)
CHLORIDE BLD-SCNC: 98 MMOL/L (ref 99–110)
CO2: 28 MMOL/L (ref 21–32)
COMPONENT: NORMAL
CREAT SERPL-MCNC: 0.9 MG/DL (ref 0.9–1.3)
CULTURE: NORMAL
CULTURE: NORMAL
D DIMER: 272 NG/ML(DDU)
DIFFERENTIAL TYPE: ABNORMAL
EOSINOPHILS ABSOLUTE: 0 K/CU MM
EOSINOPHILS RELATIVE PERCENT: 0 % (ref 0–3)
FIBRINOGEN LEVEL: 448 MG/DL (ref 196.9–442.1)
GFR AFRICAN AMERICAN: >60 ML/MIN/1.73M2
GFR NON-AFRICAN AMERICAN: >60 ML/MIN/1.73M2
GLUCOSE BLD-MCNC: 118 MG/DL (ref 70–99)
GRAM SMEAR: NORMAL
HCT VFR BLD CALC: 42.4 % (ref 42–52)
HEMOGLOBIN: 14.3 GM/DL (ref 13.5–18)
IMMATURE NEUTROPHIL %: 0.4 % (ref 0–0.43)
INR BLD: 1.05 INDEX
LYMPHOCYTES ABSOLUTE: 1.6 K/CU MM
LYMPHOCYTES RELATIVE PERCENT: 18 % (ref 24–44)
Lab: 2
Lab: NORMAL
Lab: NORMAL
MCH RBC QN AUTO: 28.7 PG (ref 27–31)
MCHC RBC AUTO-ENTMCNC: 33.7 % (ref 32–36)
MCV RBC AUTO: 85.1 FL (ref 78–100)
MONOCYTES ABSOLUTE: 1.1 K/CU MM
MONOCYTES RELATIVE PERCENT: 11.7 % (ref 0–4)
NUCLEATED RBC %: 0 %
PDW BLD-RTO: 13.7 % (ref 11.7–14.9)
PLATELET # BLD: 227 K/CU MM (ref 140–440)
PMV BLD AUTO: 10.2 FL (ref 7.5–11.1)
POTASSIUM SERPL-SCNC: 3.7 MMOL/L (ref 3.5–5.1)
PROTHROMBIN TIME: 12.7 SECONDS (ref 11.7–14.5)
RBC # BLD: 4.98 M/CU MM (ref 4.6–6.2)
SEGMENTED NEUTROPHILS ABSOLUTE COUNT: 6.3 K/CU MM
SEGMENTED NEUTROPHILS RELATIVE PERCENT: 69.8 % (ref 36–66)
SODIUM BLD-SCNC: 134 MMOL/L (ref 135–145)
SPECIMEN: NORMAL
SPECIMEN: NORMAL
STATUS: NORMAL
STATUS: NORMAL
STREP A DIRECT SCREEN: NEGATIVE
TOTAL IMMATURE NEUTOROPHIL: 0.04 K/CU MM
TOTAL NUCLEATED RBC: 0 K/CU MM
TOTAL PROTEIN: 5.9 GM/DL (ref 6.4–8.2)
TOTAL PROTEIN: 5.9 GM/DL (ref 6.4–8.2)
TRANSFUSION STATUS: NORMAL
TRANSFUSION STATUS: NORMAL
UNIT DIVISION: NORMAL
UNIT DIVISION: NORMAL
UNIT NUMBER: NORMAL
UNIT NUMBER: NORMAL
WBC # BLD: 9.1 K/CU MM (ref 4–10.5)

## 2021-01-06 PROCEDURE — 6370000000 HC RX 637 (ALT 250 FOR IP): Performed by: INTERNAL MEDICINE

## 2021-01-06 PROCEDURE — 85730 THROMBOPLASTIN TIME PARTIAL: CPT

## 2021-01-06 PROCEDURE — 2700000000 HC OXYGEN THERAPY PER DAY

## 2021-01-06 PROCEDURE — 6360000002 HC RX W HCPCS: Performed by: HOSPITALIST

## 2021-01-06 PROCEDURE — 6360000002 HC RX W HCPCS: Performed by: INTERNAL MEDICINE

## 2021-01-06 PROCEDURE — 85025 COMPLETE CBC W/AUTO DIFF WBC: CPT

## 2021-01-06 PROCEDURE — 2500000003 HC RX 250 WO HCPCS: Performed by: INTERNAL MEDICINE

## 2021-01-06 PROCEDURE — 80053 COMPREHEN METABOLIC PANEL: CPT

## 2021-01-06 PROCEDURE — 85379 FIBRIN DEGRADATION QUANT: CPT

## 2021-01-06 PROCEDURE — 94761 N-INVAS EAR/PLS OXIMETRY MLT: CPT

## 2021-01-06 PROCEDURE — 2580000003 HC RX 258: Performed by: INTERNAL MEDICINE

## 2021-01-06 PROCEDURE — 6370000000 HC RX 637 (ALT 250 FOR IP): Performed by: NURSE PRACTITIONER

## 2021-01-06 PROCEDURE — 85610 PROTHROMBIN TIME: CPT

## 2021-01-06 PROCEDURE — 82248 BILIRUBIN DIRECT: CPT

## 2021-01-06 PROCEDURE — 85384 FIBRINOGEN ACTIVITY: CPT

## 2021-01-06 PROCEDURE — 1200000000 HC SEMI PRIVATE

## 2021-01-06 PROCEDURE — 80048 BASIC METABOLIC PNL TOTAL CA: CPT

## 2021-01-06 PROCEDURE — 2580000003 HC RX 258: Performed by: NURSE PRACTITIONER

## 2021-01-06 PROCEDURE — 94640 AIRWAY INHALATION TREATMENT: CPT

## 2021-01-06 RX ORDER — POTASSIUM CHLORIDE 20 MEQ/1
20 TABLET, EXTENDED RELEASE ORAL 2 TIMES DAILY WITH MEALS
Status: DISCONTINUED | OUTPATIENT
Start: 2021-01-06 | End: 2021-01-08 | Stop reason: HOSPADM

## 2021-01-06 RX ORDER — SODIUM CHLORIDE 9 MG/ML
INJECTION, SOLUTION INTRAVENOUS CONTINUOUS
Status: ACTIVE | OUTPATIENT
Start: 2021-01-06 | End: 2021-01-06

## 2021-01-06 RX ADMIN — DEXAMETHASONE 6 MG: 4 TABLET ORAL at 10:36

## 2021-01-06 RX ADMIN — ALBUTEROL SULFATE 2 PUFF: 90 AEROSOL, METERED RESPIRATORY (INHALATION) at 20:40

## 2021-01-06 RX ADMIN — BUDESONIDE AND FORMOTEROL FUMARATE DIHYDRATE 2 PUFF: 160; 4.5 AEROSOL RESPIRATORY (INHALATION) at 20:42

## 2021-01-06 RX ADMIN — STANDARDIZED SENNA CONCENTRATE 8.6 MG: 8.6 TABLET ORAL at 20:43

## 2021-01-06 RX ADMIN — REMDESIVIR 100 MG: 100 INJECTION, POWDER, LYOPHILIZED, FOR SOLUTION INTRAVENOUS at 14:12

## 2021-01-06 RX ADMIN — Medication 2000 UNITS: at 10:36

## 2021-01-06 RX ADMIN — ENOXAPARIN SODIUM 40 MG: 40 INJECTION SUBCUTANEOUS at 20:43

## 2021-01-06 RX ADMIN — ENOXAPARIN SODIUM 40 MG: 40 INJECTION SUBCUTANEOUS at 10:35

## 2021-01-06 RX ADMIN — AZITHROMYCIN MONOHYDRATE 500 MG: 500 INJECTION, POWDER, LYOPHILIZED, FOR SOLUTION INTRAVENOUS at 15:40

## 2021-01-06 RX ADMIN — MAGNESIUM OXIDE 400 MG (241.3 MG MAGNESIUM) TABLET 200 MG: TABLET at 10:36

## 2021-01-06 RX ADMIN — STANDARDIZED SENNA CONCENTRATE 8.6 MG: 8.6 TABLET ORAL at 10:36

## 2021-01-06 RX ADMIN — CEFTRIAXONE 1 G: 1 INJECTION, POWDER, FOR SOLUTION INTRAMUSCULAR; INTRAVENOUS at 13:22

## 2021-01-06 RX ADMIN — POTASSIUM CHLORIDE 20 MEQ: 1500 TABLET, EXTENDED RELEASE ORAL at 18:08

## 2021-01-06 RX ADMIN — CALCIUM 500 MG: 500 TABLET ORAL at 10:37

## 2021-01-06 RX ADMIN — SODIUM CHLORIDE, PRESERVATIVE FREE 10 ML: 5 INJECTION INTRAVENOUS at 10:37

## 2021-01-06 RX ADMIN — SODIUM CHLORIDE: 9 INJECTION, SOLUTION INTRAVENOUS at 10:31

## 2021-01-06 RX ADMIN — MONTELUKAST 10 MG: 10 TABLET, FILM COATED ORAL at 10:36

## 2021-01-06 RX ADMIN — POTASSIUM CHLORIDE 20 MEQ: 1500 TABLET, EXTENDED RELEASE ORAL at 10:36

## 2021-01-06 RX ADMIN — SODIUM CHLORIDE, PRESERVATIVE FREE 10 ML: 5 INJECTION INTRAVENOUS at 20:46

## 2021-01-06 RX ADMIN — ALBUTEROL SULFATE 2 PUFF: 90 AEROSOL, METERED RESPIRATORY (INHALATION) at 07:27

## 2021-01-06 RX ADMIN — BUDESONIDE AND FORMOTEROL FUMARATE DIHYDRATE 2 PUFF: 160; 4.5 AEROSOL RESPIRATORY (INHALATION) at 07:26

## 2021-01-06 RX ADMIN — MULTIPLE VITAMINS W/ MINERALS TAB 1 TABLET: TAB at 10:36

## 2021-01-06 NOTE — PROGRESS NOTES
Hospitalist Progress Note      Name:  Estefania Zelaya /Age/Sex: 1955  (72 y.o. male)   MRN & CSN:  [id number] & [id number] Admission Date/Time: 1/3/2021  4:50 PM   [place]  [facility] PCP: Amee Salazar, [address] Day: 4    Assessment and Plan:   Estefania Zelaya is a 72 y.o.  male  who presents with Acute respiratory failure due to COVID-19 [facility])     #Acute hypoxic respiratory failure 2/2 Covid-19 virus infection.    -Currently on 5 L nasal cannula  -Remdesivir and steroids   -Status post plasma 2 units -5  -D-dimer  not significantly elevated   -CXR: Diffuse airspace opacities, worsening.    -will add antibiotics; ceftriaxone and azithromycin    #Hyponatremia.   #Hypokalemia  -Potassium 3.4, replace  -Sodium 128. Likely due to dehydration from Covid 18.    -Continue normal saline, gentle hydration  -Monitor BMP and replace electrolytes as needed       Other chronic medical conditions  History of asthma: Continue breathing treatments  Chronic back pain  Hypertension          Diet DIET CARDIAC;   DVT Prophylaxis [] Lovenox, []  Heparin, [] SCDs, [] Ambulation   GI Prophylaxis [] PPI,  [] H2 Blocker,  [] Carafate,  [] Diet/Tube Feeds   Code Status Full Code   Disposition Patient requires continued admission due to ARF         History of Present Illness:     Chief Complaint: Acute respiratory failure due to COVID-19 [facility])  Estefania Zelaya is a 72 y.o.  male  who presents with ARF. Patient is hard of hearing, communication was via notepad. Worsening cough, improved shortness of breath from last night. Ten point ROS reviewed negative, unless as noted above    Objective:        Intake/Output Summary (Last 24 hours) at 2021 1115  Last data filed at 2021 1031  Gross per 24 hour   Intake 480 ml   Output --   Net 480 ml      Vitals:   Vitals:    21 1030   BP: 114/67   Pulse: 79   Resp: 18   Temp: 98.1 °F (36.7 °C)   SpO2: 91%     Physical Exam:   GEN Awake male, sitting upright in bed in no apparent distress. Appears given age. EYES Pupils are equally round. No scleral erythema, discharge, or conjunctivitis. HENT Mucous membranes are moist. Oral pharynx without exudates, no evidence of thrush. NECK Supple, no apparent thyromegaly or masses. RESP Clear to auscultation, no wheezes, rales or rhonchi. Symmetric chest movement . CARDIO/VASC S1/S2 auscultated. Regular rate without appreciable murmurs, rubs, or gallops. No JVD or carotid bruits. Peripheral pulses equal bilaterally and palpable. No peripheral edema. GI Abdomen is soft without significant tenderness, masses, or guarding. Bowel sounds are normoactive. Rectal exam deferred. NEURO hard of hearing. Cranial nerves appear grossly intact, normal speech, no lateralizing weakness. PSYCH Awake, alert, oriented x 4. Affect appropriate.     Medications:   Medications:    potassium chloride  20 mEq Oral BID WC    remdesivir IVPB  100 mg Intravenous Q24H    dexamethasone  6 mg Oral Daily    enoxaparin  40 mg Subcutaneous BID    calcium elemental  500 mg Oral Daily    magnesium oxide  200 mg Oral Daily    montelukast  10 mg Oral Daily    therapeutic multivitamin-minerals  1 tablet Oral Daily    senna  1 tablet Oral BID    sodium chloride flush  10 mL Intravenous 2 times per day    Vitamin D  2,000 Units Oral Daily    influenza virus vaccine  0.5 mL Intramuscular Prior to discharge    budesonide-formoterol  2 puff Inhalation BID      Infusions:    sodium chloride 100 mL/hr at 01/06/21 1031     PRN Meds:     benzonatate, 200 mg, TID PRN      albuterol sulfate HFA, 2 puff, Q6H PRN      sodium chloride flush, 10 mL, PRN      promethazine, 12.5 mg, Q6H PRN    Or      ondansetron, 4 mg, Q6H PRN      polyethylene glycol, 17 g, Daily PRN      acetaminophen, 650 mg, Q6H PRN    Or      acetaminophen, 650 mg, Q6H PRN      guaiFENesin-dextromethorphan, 5 mL, Q4H PRN          Electronically signed by Ceci Montes

## 2021-01-07 LAB
ALBUMIN SERPL-MCNC: 3.4 GM/DL (ref 3.4–5)
ALBUMIN SERPL-MCNC: 3.4 GM/DL (ref 3.4–5)
ALP BLD-CCNC: 53 IU/L (ref 40–128)
ALP BLD-CCNC: 53 IU/L (ref 40–129)
ALT SERPL-CCNC: 26 U/L (ref 10–40)
ALT SERPL-CCNC: 26 U/L (ref 10–40)
ANION GAP SERPL CALCULATED.3IONS-SCNC: 9 MMOL/L (ref 4–16)
APTT: 38.8 SECONDS (ref 25.1–37.1)
AST SERPL-CCNC: 23 IU/L (ref 15–37)
AST SERPL-CCNC: 23 IU/L (ref 15–37)
BASOPHILS ABSOLUTE: 0 K/CU MM
BASOPHILS RELATIVE PERCENT: 0.1 % (ref 0–1)
BILIRUB SERPL-MCNC: 0.5 MG/DL (ref 0–1)
BILIRUB SERPL-MCNC: 0.5 MG/DL (ref 0–1)
BILIRUBIN DIRECT: 0.2 MG/DL (ref 0–0.3)
BILIRUBIN, INDIRECT: 0.3 MG/DL (ref 0–0.7)
BUN BLDV-MCNC: 15 MG/DL (ref 6–23)
CALCIUM SERPL-MCNC: 8 MG/DL (ref 8.3–10.6)
CHLORIDE BLD-SCNC: 103 MMOL/L (ref 99–110)
CO2: 25 MMOL/L (ref 21–32)
CREAT SERPL-MCNC: 0.8 MG/DL (ref 0.9–1.3)
D DIMER: 240 NG/ML(DDU)
DIFFERENTIAL TYPE: ABNORMAL
EKG ATRIAL RATE: 113 BPM
EKG DIAGNOSIS: NORMAL
EKG P AXIS: 18 DEGREES
EKG P-R INTERVAL: 116 MS
EKG Q-T INTERVAL: 322 MS
EKG QRS DURATION: 86 MS
EKG QTC CALCULATION (BAZETT): 441 MS
EKG R AXIS: -44 DEGREES
EKG T AXIS: 56 DEGREES
EKG VENTRICULAR RATE: 113 BPM
EOSINOPHILS ABSOLUTE: 0 K/CU MM
EOSINOPHILS RELATIVE PERCENT: 0 % (ref 0–3)
FIBRINOGEN LEVEL: 413 MG/DL (ref 196.9–442.1)
GFR AFRICAN AMERICAN: >60 ML/MIN/1.73M2
GFR NON-AFRICAN AMERICAN: >60 ML/MIN/1.73M2
GLUCOSE BLD-MCNC: 120 MG/DL (ref 70–99)
HCT VFR BLD CALC: 44 % (ref 42–52)
HEMOGLOBIN: 14.5 GM/DL (ref 13.5–18)
IMMATURE NEUTROPHIL %: 0.5 % (ref 0–0.43)
INR BLD: 1.02 INDEX
LYMPHOCYTES ABSOLUTE: 1.2 K/CU MM
LYMPHOCYTES RELATIVE PERCENT: 15.1 % (ref 24–44)
MCH RBC QN AUTO: 28.2 PG (ref 27–31)
MCHC RBC AUTO-ENTMCNC: 33 % (ref 32–36)
MCV RBC AUTO: 85.6 FL (ref 78–100)
MONOCYTES ABSOLUTE: 0.7 K/CU MM
MONOCYTES RELATIVE PERCENT: 8.3 % (ref 0–4)
NUCLEATED RBC %: 0 %
PDW BLD-RTO: 13.5 % (ref 11.7–14.9)
PLATELET # BLD: 252 K/CU MM (ref 140–440)
PMV BLD AUTO: 10.1 FL (ref 7.5–11.1)
POTASSIUM SERPL-SCNC: 4.3 MMOL/L (ref 3.5–5.1)
PROTHROMBIN TIME: 12.3 SECONDS (ref 11.7–14.5)
RBC # BLD: 5.14 M/CU MM (ref 4.6–6.2)
SEGMENTED NEUTROPHILS ABSOLUTE COUNT: 6.1 K/CU MM
SEGMENTED NEUTROPHILS RELATIVE PERCENT: 76 % (ref 36–66)
SODIUM BLD-SCNC: 137 MMOL/L (ref 135–145)
TOTAL IMMATURE NEUTOROPHIL: 0.04 K/CU MM
TOTAL NUCLEATED RBC: 0 K/CU MM
TOTAL PROTEIN: 6.1 GM/DL (ref 6.4–8.2)
TOTAL PROTEIN: 6.1 GM/DL (ref 6.4–8.2)
WBC # BLD: 8.1 K/CU MM (ref 4–10.5)

## 2021-01-07 PROCEDURE — 85730 THROMBOPLASTIN TIME PARTIAL: CPT

## 2021-01-07 PROCEDURE — 85610 PROTHROMBIN TIME: CPT

## 2021-01-07 PROCEDURE — 6370000000 HC RX 637 (ALT 250 FOR IP): Performed by: INTERNAL MEDICINE

## 2021-01-07 PROCEDURE — 82248 BILIRUBIN DIRECT: CPT

## 2021-01-07 PROCEDURE — 94640 AIRWAY INHALATION TREATMENT: CPT

## 2021-01-07 PROCEDURE — 1200000000 HC SEMI PRIVATE

## 2021-01-07 PROCEDURE — 6370000000 HC RX 637 (ALT 250 FOR IP): Performed by: NURSE PRACTITIONER

## 2021-01-07 PROCEDURE — 6360000002 HC RX W HCPCS: Performed by: HOSPITALIST

## 2021-01-07 PROCEDURE — 2700000000 HC OXYGEN THERAPY PER DAY

## 2021-01-07 PROCEDURE — 80053 COMPREHEN METABOLIC PANEL: CPT

## 2021-01-07 PROCEDURE — 2500000003 HC RX 250 WO HCPCS: Performed by: INTERNAL MEDICINE

## 2021-01-07 PROCEDURE — 85384 FIBRINOGEN ACTIVITY: CPT

## 2021-01-07 PROCEDURE — 85025 COMPLETE CBC W/AUTO DIFF WBC: CPT

## 2021-01-07 PROCEDURE — 94761 N-INVAS EAR/PLS OXIMETRY MLT: CPT

## 2021-01-07 PROCEDURE — 80048 BASIC METABOLIC PNL TOTAL CA: CPT

## 2021-01-07 PROCEDURE — 85379 FIBRIN DEGRADATION QUANT: CPT

## 2021-01-07 PROCEDURE — 2580000003 HC RX 258: Performed by: INTERNAL MEDICINE

## 2021-01-07 PROCEDURE — 2580000003 HC RX 258: Performed by: NURSE PRACTITIONER

## 2021-01-07 RX ORDER — AZITHROMYCIN 250 MG/1
250 TABLET, FILM COATED ORAL DAILY
Status: COMPLETED | OUTPATIENT
Start: 2021-01-07 | End: 2021-01-08

## 2021-01-07 RX ADMIN — POTASSIUM CHLORIDE 20 MEQ: 1500 TABLET, EXTENDED RELEASE ORAL at 09:54

## 2021-01-07 RX ADMIN — STANDARDIZED SENNA CONCENTRATE 8.6 MG: 8.6 TABLET ORAL at 09:54

## 2021-01-07 RX ADMIN — REMDESIVIR 100 MG: 100 INJECTION, POWDER, LYOPHILIZED, FOR SOLUTION INTRAVENOUS at 14:48

## 2021-01-07 RX ADMIN — BUDESONIDE AND FORMOTEROL FUMARATE DIHYDRATE 2 PUFF: 160; 4.5 AEROSOL RESPIRATORY (INHALATION) at 07:21

## 2021-01-07 RX ADMIN — BUDESONIDE AND FORMOTEROL FUMARATE DIHYDRATE 2 PUFF: 160; 4.5 AEROSOL RESPIRATORY (INHALATION) at 20:41

## 2021-01-07 RX ADMIN — ALBUTEROL SULFATE 2 PUFF: 90 AEROSOL, METERED RESPIRATORY (INHALATION) at 07:21

## 2021-01-07 RX ADMIN — SODIUM CHLORIDE, PRESERVATIVE FREE 10 ML: 5 INJECTION INTRAVENOUS at 21:24

## 2021-01-07 RX ADMIN — DEXAMETHASONE 8 MG: 4 TABLET ORAL at 09:53

## 2021-01-07 RX ADMIN — STANDARDIZED SENNA CONCENTRATE 8.6 MG: 8.6 TABLET ORAL at 21:23

## 2021-01-07 RX ADMIN — ENOXAPARIN SODIUM 40 MG: 40 INJECTION SUBCUTANEOUS at 09:53

## 2021-01-07 RX ADMIN — POTASSIUM CHLORIDE 20 MEQ: 1500 TABLET, EXTENDED RELEASE ORAL at 17:29

## 2021-01-07 RX ADMIN — MULTIPLE VITAMINS W/ MINERALS TAB 1 TABLET: TAB at 09:54

## 2021-01-07 RX ADMIN — CALCIUM 500 MG: 500 TABLET ORAL at 09:53

## 2021-01-07 RX ADMIN — Medication 2000 UNITS: at 09:53

## 2021-01-07 RX ADMIN — MAGNESIUM OXIDE 400 MG (241.3 MG MAGNESIUM) TABLET 400 MG: TABLET at 09:53

## 2021-01-07 RX ADMIN — ENOXAPARIN SODIUM 40 MG: 40 INJECTION SUBCUTANEOUS at 21:24

## 2021-01-07 RX ADMIN — MONTELUKAST 10 MG: 10 TABLET, FILM COATED ORAL at 09:53

## 2021-01-07 RX ADMIN — AZITHROMYCIN MONOHYDRATE 250 MG: 250 TABLET ORAL at 11:19

## 2021-01-07 ASSESSMENT — PAIN SCALES - GENERAL
PAINLEVEL_OUTOF10: 0
PAINLEVEL_OUTOF10: 0

## 2021-01-07 ASSESSMENT — PAIN DESCRIPTION - LOCATION: LOCATION: CHEST

## 2021-01-07 NOTE — PROGRESS NOTES
Lower pt O2 to 3 L . . pt is bring continous monitored. Pt also took shower this morning. Could be poss D/C tomorrow.

## 2021-01-07 NOTE — PROGRESS NOTES
Hospitalist Progress Note      Name:  Billy Jansen /Age/Sex: 1955  (72 y.o. male)   MRN & CSN:  [id number] & [id number] Admission Date/Time: 1/3/2021  4:50 PM   Location:  [address] [place] [address] PCP: Domingo Suazo MD         Hospital Day: 5    Assessment and Plan:   Billy Jansen is a 72 y.o.  male  who presents with Acute respiratory failure due to COVID-19 [facility])      #Acute hypoxic respiratory failure 2/2 Covid-19 virus infection.    -Currently on 5 L nasal cannula, weaning down. Was on 3LNC this morning  -Remdesivir and steroids   -Status post plasma 2 units -5  -D-dimer  not significantly elevated   -CXR: Diffuse airspace opacities, worsening.    -will add antibiotics; ceftriaxone and azithromycin     #Hyponatremia: Resolved  #Hypokalemia : Resolved  -Potassium 3.4, replace  -Monitor BMP and replace electrolytes as needed        Other chronic medical conditions  History of asthma: Continue breathing treatments  Chronic back pain  Hypertension    #Addendum 12:25 PM.  Nurse called patient lost IV access declining another access placed. Explained that the MCV is more targeted on the antibiotics should try to initiate another IV access. However if patient continues to decline risks and benefits would be explained to him    Diet DIET CARDIAC;   DVT Prophylaxis [] Lovenox, []  Heparin, [] SCDs, [] Ambulation   GI Prophylaxis [] PPI,  [] H2 Blocker,  [] Carafate,  [] Diet/Tube Feeds   Code Status Full Code   Disposition Patient requires continued admission due to ARF         History of Present Illness:     Chief Complaint: Acute respiratory failure due to COVID-19 [facility])  Billy Jansen is a 72 y.o.  male  who presents with ARF. Patient is hard of hearing communication was with notepad. He reports feeling better today, still concerned about his oxygen requirements. Explained that since he has been stable, we hope to try to wean him off. If unable to we will discharge home oxygen.   He verbalized his understanding       Ten point ROS reviewed negative, unless as noted above    Objective:   No intake or output data in the 24 hours ending 01/07/21 1125   Vitals:   Vitals:    01/07/21 0722   BP:    Pulse:    Resp:    Temp:    SpO2: 91%     Physical Exam:   GEN    Awake male, sitting upright in bed in no apparent distress. Appears given age. EYES   Pupils are equally round. No scleral erythema, discharge, or conjunctivitis. HENT  Mucous membranes are moist. Oral pharynx without exudates, no evidence of thrush. NECK  Supple, no apparent thyromegaly or masses. RESP  Clear to auscultation, no wheezes, rales or rhonchi. Symmetric chest movement  CARDIO/VASC           S1/S2 auscultated. Regular rate without appreciable murmurs, rubs, or gallops. No JVD or carotid bruits. Peripheral pulses equal bilaterally and palpable. No peripheral edema. GI        Abdomen is soft without significant tenderness, masses, or guarding. Bowel sounds are normoactive. Rectal exam deferred. NEURO           hard of hearing. Cranial nerves appear grossly intact, normal speech, no lateralizing weakness. PSYCH            Awake, alert, oriented x 4. Affect appropriate.     Medications:   Medications:    azithromycin  250 mg Oral Daily    potassium chloride  20 mEq Oral BID WC    remdesivir IVPB  100 mg Intravenous Q24H    dexamethasone  6 mg Oral Daily    enoxaparin  40 mg Subcutaneous BID    calcium elemental  500 mg Oral Daily    magnesium oxide  200 mg Oral Daily    montelukast  10 mg Oral Daily    therapeutic multivitamin-minerals  1 tablet Oral Daily    senna  1 tablet Oral BID    sodium chloride flush  10 mL Intravenous 2 times per day    Vitamin D  2,000 Units Oral Daily    influenza virus vaccine  0.5 mL Intramuscular Prior to discharge    budesonide-formoterol  2 puff Inhalation BID      Infusions:   PRN Meds:     benzonatate, 200 mg, TID PRN      albuterol sulfate HFA, 2 puff, Q6H PRN      sodium chloride flush, 10 mL, PRN      promethazine, 12.5 mg, Q6H PRN    Or      ondansetron, 4 mg, Q6H PRN      polyethylene glycol, 17 g, Daily PRN      acetaminophen, 650 mg, Q6H PRN    Or      acetaminophen, 650 mg, Q6H PRN      guaiFENesin-dextromethorphan, 5 mL, Q4H PRN          Electronically signed by Amina Mckay MD on 1/7/2021 at 11:25 AM

## 2021-01-07 NOTE — CARE COORDINATION
Reviewed chart and spoke with s/o Gerardo Colin is home with her, declined HC, and understands pt may need Home O2. Cm available if any new needs arise.

## 2021-01-08 VITALS
RESPIRATION RATE: 18 BRPM | DIASTOLIC BLOOD PRESSURE: 76 MMHG | SYSTOLIC BLOOD PRESSURE: 118 MMHG | TEMPERATURE: 98 F | OXYGEN SATURATION: 95 % | BODY MASS INDEX: 36.52 KG/M2 | HEIGHT: 70 IN | WEIGHT: 255.1 LBS | HEART RATE: 76 BPM

## 2021-01-08 LAB
ALBUMIN SERPL-MCNC: 2.6 GM/DL (ref 3.4–5)
ALBUMIN SERPL-MCNC: 2.6 GM/DL (ref 3.4–5)
ALP BLD-CCNC: 71 IU/L (ref 40–128)
ALP BLD-CCNC: 71 IU/L (ref 40–129)
ALT SERPL-CCNC: 10 U/L (ref 10–40)
ALT SERPL-CCNC: 10 U/L (ref 10–40)
ANION GAP SERPL CALCULATED.3IONS-SCNC: 12 MMOL/L (ref 4–16)
ANION GAP SERPL CALCULATED.3IONS-SCNC: 7 MMOL/L (ref 4–16)
AST SERPL-CCNC: 13 IU/L (ref 15–37)
AST SERPL-CCNC: 13 IU/L (ref 15–37)
BASOPHILS ABSOLUTE: 0 K/CU MM
BASOPHILS RELATIVE PERCENT: 0.1 % (ref 0–1)
BILIRUB SERPL-MCNC: 0.4 MG/DL (ref 0–1)
BILIRUB SERPL-MCNC: 0.4 MG/DL (ref 0–1)
BILIRUBIN DIRECT: 0.2 MG/DL (ref 0–0.3)
BILIRUBIN, INDIRECT: 0.2 MG/DL (ref 0–0.7)
BUN BLDV-MCNC: 19 MG/DL (ref 6–23)
BUN BLDV-MCNC: 38 MG/DL (ref 6–23)
CALCIUM SERPL-MCNC: 8 MG/DL (ref 8.3–10.6)
CALCIUM SERPL-MCNC: 8.3 MG/DL (ref 8.3–10.6)
CHLORIDE BLD-SCNC: 100 MMOL/L (ref 99–110)
CHLORIDE BLD-SCNC: 93 MMOL/L (ref 99–110)
CO2: 21 MMOL/L (ref 21–32)
CO2: 32 MMOL/L (ref 21–32)
CREAT SERPL-MCNC: 0.9 MG/DL (ref 0.9–1.3)
CREAT SERPL-MCNC: 1.5 MG/DL (ref 0.9–1.3)
CULTURE: NORMAL
CULTURE: NORMAL
DIFFERENTIAL TYPE: ABNORMAL
EOSINOPHILS ABSOLUTE: 0.1 K/CU MM
EOSINOPHILS RELATIVE PERCENT: 1.1 % (ref 0–3)
GFR AFRICAN AMERICAN: 57 ML/MIN/1.73M2
GFR AFRICAN AMERICAN: >60 ML/MIN/1.73M2
GFR NON-AFRICAN AMERICAN: 47 ML/MIN/1.73M2
GFR NON-AFRICAN AMERICAN: >60 ML/MIN/1.73M2
GLUCOSE BLD-MCNC: 167 MG/DL (ref 70–99)
GLUCOSE BLD-MCNC: 57 MG/DL (ref 70–99)
HCT VFR BLD CALC: 38 % (ref 42–52)
HEMOGLOBIN: 11.9 GM/DL (ref 13.5–18)
IMMATURE NEUTROPHIL %: 0.8 % (ref 0–0.43)
LYMPHOCYTES ABSOLUTE: 0.9 K/CU MM
LYMPHOCYTES RELATIVE PERCENT: 10.8 % (ref 24–44)
Lab: NORMAL
Lab: NORMAL
MCH RBC QN AUTO: 29.1 PG (ref 27–31)
MCHC RBC AUTO-ENTMCNC: 31.3 % (ref 32–36)
MCV RBC AUTO: 92.9 FL (ref 78–100)
MONOCYTES ABSOLUTE: 0.6 K/CU MM
MONOCYTES RELATIVE PERCENT: 6.9 % (ref 0–4)
NUCLEATED RBC %: 0 %
PDW BLD-RTO: 12.7 % (ref 11.7–14.9)
PLATELET # BLD: 208 K/CU MM (ref 140–440)
PMV BLD AUTO: 10.1 FL (ref 7.5–11.1)
POTASSIUM SERPL-SCNC: 4.5 MMOL/L (ref 3.5–5.1)
POTASSIUM SERPL-SCNC: 4.6 MMOL/L (ref 3.5–5.1)
RBC # BLD: 4.09 M/CU MM (ref 4.6–6.2)
SEGMENTED NEUTROPHILS ABSOLUTE COUNT: 7 K/CU MM
SEGMENTED NEUTROPHILS RELATIVE PERCENT: 80.3 % (ref 36–66)
SODIUM BLD-SCNC: 132 MMOL/L (ref 135–145)
SODIUM BLD-SCNC: 133 MMOL/L (ref 135–145)
SPECIMEN: NORMAL
SPECIMEN: NORMAL
TOTAL IMMATURE NEUTOROPHIL: 0.07 K/CU MM
TOTAL NUCLEATED RBC: 0 K/CU MM
TOTAL PROTEIN: 5.5 GM/DL (ref 6.4–8.2)
TOTAL PROTEIN: 5.5 GM/DL (ref 6.4–8.2)
WBC # BLD: 8.7 K/CU MM (ref 4–10.5)

## 2021-01-08 PROCEDURE — 94761 N-INVAS EAR/PLS OXIMETRY MLT: CPT

## 2021-01-08 PROCEDURE — 94664 DEMO&/EVAL PT USE INHALER: CPT

## 2021-01-08 PROCEDURE — 94618 PULMONARY STRESS TESTING: CPT

## 2021-01-08 PROCEDURE — 2580000003 HC RX 258: Performed by: NURSE PRACTITIONER

## 2021-01-08 PROCEDURE — 2580000003 HC RX 258: Performed by: INTERNAL MEDICINE

## 2021-01-08 PROCEDURE — 85379 FIBRIN DEGRADATION QUANT: CPT

## 2021-01-08 PROCEDURE — 2700000000 HC OXYGEN THERAPY PER DAY

## 2021-01-08 PROCEDURE — 82248 BILIRUBIN DIRECT: CPT

## 2021-01-08 PROCEDURE — 85025 COMPLETE CBC W/AUTO DIFF WBC: CPT

## 2021-01-08 PROCEDURE — 6360000002 HC RX W HCPCS: Performed by: HOSPITALIST

## 2021-01-08 PROCEDURE — 94640 AIRWAY INHALATION TREATMENT: CPT

## 2021-01-08 PROCEDURE — 80053 COMPREHEN METABOLIC PANEL: CPT

## 2021-01-08 PROCEDURE — 2500000003 HC RX 250 WO HCPCS: Performed by: INTERNAL MEDICINE

## 2021-01-08 PROCEDURE — 80048 BASIC METABOLIC PNL TOTAL CA: CPT

## 2021-01-08 PROCEDURE — 6370000000 HC RX 637 (ALT 250 FOR IP): Performed by: INTERNAL MEDICINE

## 2021-01-08 PROCEDURE — 6370000000 HC RX 637 (ALT 250 FOR IP): Performed by: NURSE PRACTITIONER

## 2021-01-08 RX ORDER — MAGNESIUM OXIDE 400 MG/1
200 TABLET ORAL DAILY
Qty: 30 TABLET | Refills: 1 | Status: SHIPPED | OUTPATIENT
Start: 2021-01-09

## 2021-01-08 RX ORDER — CALCIUM CARBONATE 500(1250)
500 TABLET ORAL DAILY
Qty: 30 TABLET | Refills: 3 | Status: SHIPPED | OUTPATIENT
Start: 2021-01-09

## 2021-01-08 RX ORDER — DEXAMETHASONE 6 MG/1
6 TABLET ORAL DAILY
Qty: 4 TABLET | Refills: 0 | Status: SHIPPED | OUTPATIENT
Start: 2021-01-09 | End: 2021-01-13

## 2021-01-08 RX ORDER — SENNA PLUS 8.6 MG/1
1 TABLET ORAL 2 TIMES DAILY
Qty: 28 TABLET | Refills: 0 | Status: SHIPPED | OUTPATIENT
Start: 2021-01-08 | End: 2022-01-08

## 2021-01-08 RX ORDER — SODIUM CHLORIDE 9 MG/ML
INJECTION, SOLUTION INTRAVENOUS CONTINUOUS
Status: ACTIVE | OUTPATIENT
Start: 2021-01-08 | End: 2021-01-08

## 2021-01-08 RX ORDER — BENZONATATE 200 MG/1
200 CAPSULE ORAL 3 TIMES DAILY PRN
Qty: 21 CAPSULE | Refills: 0 | Status: SHIPPED | OUTPATIENT
Start: 2021-01-08 | End: 2021-01-15

## 2021-01-08 RX ORDER — GUAIFENESIN/DEXTROMETHORPHAN 100-10MG/5
5 SYRUP ORAL EVERY 4 HOURS PRN
Qty: 120 ML | Refills: 0 | Status: SHIPPED | OUTPATIENT
Start: 2021-01-08 | End: 2021-01-18

## 2021-01-08 RX ORDER — CHOLECALCIFEROL (VITAMIN D3) 50 MCG
2000 TABLET ORAL DAILY
Qty: 60 TABLET | Refills: 0 | Status: SHIPPED | OUTPATIENT
Start: 2021-01-09

## 2021-01-08 RX ORDER — AZITHROMYCIN 250 MG/1
500 TABLET, FILM COATED ORAL DAILY
Qty: 4 TABLET | Refills: 0 | Status: SHIPPED | OUTPATIENT
Start: 2021-01-08 | End: 2021-01-10

## 2021-01-08 RX ADMIN — MAGNESIUM OXIDE 400 MG (241.3 MG MAGNESIUM) TABLET 200 MG: TABLET at 08:10

## 2021-01-08 RX ADMIN — POTASSIUM CHLORIDE 20 MEQ: 1500 TABLET, EXTENDED RELEASE ORAL at 08:10

## 2021-01-08 RX ADMIN — ENOXAPARIN SODIUM 40 MG: 40 INJECTION SUBCUTANEOUS at 08:09

## 2021-01-08 RX ADMIN — DEXAMETHASONE 6 MG: 4 TABLET ORAL at 08:09

## 2021-01-08 RX ADMIN — MULTIPLE VITAMINS W/ MINERALS TAB 1 TABLET: TAB at 08:10

## 2021-01-08 RX ADMIN — BUDESONIDE AND FORMOTEROL FUMARATE DIHYDRATE 2 PUFF: 160; 4.5 AEROSOL RESPIRATORY (INHALATION) at 07:22

## 2021-01-08 RX ADMIN — Medication 2000 UNITS: at 08:09

## 2021-01-08 RX ADMIN — AZITHROMYCIN MONOHYDRATE 250 MG: 250 TABLET ORAL at 08:10

## 2021-01-08 RX ADMIN — SODIUM CHLORIDE: 9 INJECTION, SOLUTION INTRAVENOUS at 13:49

## 2021-01-08 RX ADMIN — CALCIUM 500 MG: 500 TABLET ORAL at 08:09

## 2021-01-08 RX ADMIN — REMDESIVIR 100 MG: 100 INJECTION, POWDER, LYOPHILIZED, FOR SOLUTION INTRAVENOUS at 13:49

## 2021-01-08 RX ADMIN — MONTELUKAST 10 MG: 10 TABLET, FILM COATED ORAL at 08:09

## 2021-01-08 RX ADMIN — STANDARDIZED SENNA CONCENTRATE 8.6 MG: 8.6 TABLET ORAL at 08:09

## 2021-01-08 RX ADMIN — SODIUM CHLORIDE, PRESERVATIVE FREE 10 ML: 5 INJECTION INTRAVENOUS at 08:10

## 2021-01-08 NOTE — PROGRESS NOTES
Doctor Please copy and paste below in your progress note per DME requirements. Patient was seen in hospital for COVID-19     . I am prescribing oxygen because the diagnosis and testing requires the patient to have oxygen in the home. Conditions will improve or be benefited by oxygen use. The patient is able to perform good mobility and therefore requires the use of a portable oxygen system for ambulation.

## 2021-01-08 NOTE — PROGRESS NOTES
Nutrition Assessment     Type and Reason for Visit: Initial    Nutrition Recommendations/Plan:    Continue current diet as ordered  Please document po intake  Will monitor po intake, nutrition status, poc    Nutrition Assessment:  pt admitted for hypoxia, acute respiratory failure, covid-19, pt on cardiac diet, no significant wt loss noted, pt at low nutrition risk    Malnutrition Assessment:  Malnutrition Status:  no malnutrition    Estimated Daily Nutrient Needs:  Energy (kcal): 6435-0385([place] [address] Jeor w/ stress factor 1.0-1.2); Weight Used for Energy Requirements:  Current     Protein (g): 75-90(1.0-1.2 g/kg); Weight Used for Protein Requirements:  Ideal        Fluid (ml/day): 1950; Weight Used for Fluid Requirements:  1 ml/kcal      Nutrition Related Findings: droplet plus isolation      Current Nutrition Therapies:    DIET CARDIAC; Anthropometric Measures:  · Height: 5' 10\" (177.8 cm)  · Current Body Wt: 255 lb 1.2 oz (115.7 kg)   · BMI: 36.6    Nutrition Diagnosis:   No nutrition diagnosis at this time     Nutrition Interventions:   Food and/or Nutrient Delivery:  Continue Current Diet  Nutrition Education/Counseling:  No recommendation at this time   Coordination of Nutrition Care:  Continue to monitor while inpatient    Goals:  pt will consume greater than 75% of meals and supplements       Nutrition Monitoring and Evaluation:   Behavioral-Environmental Outcomes:  None Identified   Food/Nutrient Intake Outcomes:  Food and Nutrient Intake  Physical Signs/Symptoms Outcomes:  Biochemical Data, Weight, Hemodynamic Status, GI Status     Discharge Planning:     Too soon to determine     Electronically signed by Abigail Rodrigues MS, RD, LD on 1/8/21 at 3:58 PM EST    Contact: [phone]

## 2021-01-08 NOTE — DISCHARGE SUMMARY
Discharge Summary    Name:  Katerina Ward /Age/Sex: 1955  (72 y.o. male)   MRN & CSN:  [id number] & [id number] Admission Date/Time: 1/3/2021  4:50 PM   Attending:  Sarah Lea MD Discharging Physician: Sarah Lea MD     Hospital Course:   Katerina Ward is a 72 y.o.  male  who presents with Acute respiratory failure due to COVID-19 [facility]     He is a 49-year-old male with past medical history significant for hypertension, asthma, chronic back pain, who presented to the hospital because of cough, shortness of breath, sore throat, body aches and sinus pressure. At the ER, blood pressure was 119/81, afebrile, tachycardic at 107, respiration 22, saturation 89% at room air. Covid test came back positive. Chest x-ray did not show acute process. He was given convalescent plasma, remdesivir, steroid. Also started on antibiotic for possible secondary bacterial infection. Symptoms improved. His oxygen support has come down. He qualified for home oxygen. He was found to have acute kidney injury. He was given hydration. Repeat creatinine came back normal.    He was discharged stable. Acute hypoxic respiratory failure due to Covid-19 virus infection.    On oxygen by nasal cannula 2 L. Remdesivir and steroids given  Status post plasma 2 units 1/4-5  D-dimer  not significantly elevated   CXR: Diffuse airspace opacities, worsening.    Discharged on azithromycin. DVT prophylaxis      Hyponatremia: Resolved  Hypokalemia : Resolved  Potassium 3.4, replace  Monitor BMP and replace electrolytes as needed      Other chronic medical conditions  History of asthma: Continue breathing treatments  Chronic back pain  Hypertension    Patient was seen in hospital for COVID-19     . I am prescribing oxygen because the diagnosis and testing requires the patient to have oxygen in the home. Conditions will improve or be benefited by oxygen use.   The patient is able to perform good mobility and therefore requires the use of a portable oxygen system for ambulation. The patient expressed appropriate understanding of and agreement with the discharge recommendations, medications, and plan. Consults this admission:  IP CONSULT TO HOSPITALIST  IP CONSULT TO PHARMACY    Discharge Instruction:   Follow up appointments:   Primary care physician:  within 2 weeks    Diet:  regular diet   Activity: activity as tolerated  Disposition: Discharged to:   [x]Home, [][facility], []SNF, []Acute Rehab, []Hospice   Condition on discharge: Stable     Discharge Medications:      Doroteo Ireland   Bud Medication Instructions DAYNE:[id number]    Printed on:01/08/21 3521   Medication Information                      albuterol sulfate HFA (PROAIR HFA) 108 (90 Base) MCG/ACT inhaler  Inhale 2 puffs into the lungs every 6 hours as needed for Wheezing             fluticasone-salmeterol (ADVAIR) 250-50 MCG/DOSE AEPB  Inhale 1 puff into the lungs every 12 hours             montelukast (SINGULAIR) 10 MG tablet  Take 1 tablet by mouth daily             Multiple Vitamins-Minerals (CENTRUM SILVER 50+MEN) TABS  Take 1 tablet by mouth daily                 Objective Findings at Discharge:   /76   Pulse 76   Temp 98 °F (36.7 °C) (Oral)   Resp 18   Ht 5' 10\" (1.778 m)   Wt 255 lb 1.6 oz (115.7 kg)   SpO2 95%   BMI 36.60 kg/m²            PHYSICAL EXAM   GEN Awake male, sitting upright in bed in no apparent distress. Appears given age. EYES Pupils are equally round. No scleral erythema, discharge, or conjunctivitis. HENT Mucous membranes are moist. Oral pharynx without exudates, no evidence of thrush. NECK Supple, no apparent thyromegaly or masses. RESP Clear to auscultation, no wheezes, rales or rhonchi. Symmetric chest movement while on room air. CARDIO/VASC S1/S2 auscultated. Regular rate without appreciable murmurs, rubs, or gallops. No JVD or carotid bruits. Peripheral pulses equal bilaterally and palpable.  No peripheral edema.  GI Abdomen is soft without significant tenderness, masses, or guarding. Bowel sounds are normoactive. Rectal exam deferred. MSK No gross joint deformities. SKIN Normal coloration, warm, dry. NEURO Cranial nerves appear grossly intact, normal speech, no lateralizing weakness. PSYCH Awake, alert, oriented x 4. Affect appropriate. BMP/CBC  Recent Labs     01/06/21  1301 01/07/21  0655 01/08/21  0349   * 137 132*   K 3.7 4.3 4.6   CL 98* 103 93*   CO2 28 25 32   BUN 14 15 38*   CREATININE 0.9 0.8* 1.5*   WBC 9.1 8.1 8.7   HCT 42.4 44.0 38.0*    252 208       IMAGING:  Xr Chest Portable    Result Date: 1/6/2021  EXAMINATION: ONE XRAY VIEW OF THE CHEST 1/5/2021 6:11 am COMPARISON: January 3, 2021 HISTORY: ORDERING SYSTEM PROVIDED HISTORY: increased O2 requirement TECHNOLOGIST PROVIDED HISTORY: Reason for exam:->increased O2 requirement Reason for Exam: increased O2 requirement Acuity: Acute Type of Exam: Initial FINDINGS: No lines or tubes. Normal cardiomediastinal silhouette. The lungs demonstrate interval worsening of diffuse airspace opacities. No significant pleural effusions. No pneumothorax. No acute osseous abnormality. 1. Diffuse airspace opacities, worsened since January 3, 2021. Findings are compatible with viral pneumonia. Xr Chest Portable    Result Date: 1/3/2021  EXAMINATION: ONE XRAY VIEW OF THE CHEST 1/3/2021 5:21 pm COMPARISON: Chest x-ray November 2, 2020 HISTORY: ORDERING SYSTEM PROVIDED HISTORY: cough, hypoxia TECHNOLOGIST PROVIDED HISTORY: Reason for exam:->cough, hypoxia FINDINGS: Cardiac silhouette is stable. Chronic interstitial changes of the lungs which are not significantly changed when compared with exam from November 2, 2020. No focal consolidation, pleural effusion, or pneumothorax identified. Osseous structures appear intact. 1. No acute cardiopulmonary process identified.        Discharge Time of 40 minutes    Electronically signed by Jana Franklin Indigo Jimenez MD on 1/8/2021 at 12:24 PM

## 2021-01-08 NOTE — PROGRESS NOTES
Pt qualified for home oxygen. Paperwork faxed to [employer]. Please do not discharge pt without oxygen. This testing will  and have to be repeated if pt has not discharged 48 hours from time testing was ordered. Please call [facility] @ [phone] if oxygen has not been delivered prior to pt discharging. Thanks.

## 2021-01-09 ENCOUNTER — CARE COORDINATION (OUTPATIENT)
Dept: CASE MANAGEMENT | Age: 66
End: 2021-01-09

## 2021-01-09 NOTE — CARE COORDINATION
[facility] Transitions Initial Follow Up Call    Call within 2 business days of discharge: Yes    Patient:  Clearance Pu  Patient :  1955  MRN:  <[id number]>   Reason for Admission:  Hypoxia - covid 19   Discharge Date:  21  RARS:       CTC attempt to reach Pt regarding recent hospital discharge. CTC left voice recording with call back number requesting a call back. Follow up appointments:    No future appointments. MARCI Patrick, RN  Care Transition Coordinator  Contact Number:  [phone]

## 2021-01-11 ENCOUNTER — CARE COORDINATION (OUTPATIENT)
Dept: CASE MANAGEMENT | Age: 66
End: 2021-01-11

## 2021-01-11 NOTE — CARE COORDINATION
[facility] Trans[facility] Initial Follow Up Call    Call within 2 business days of discharge: Yes    Patient: Margarita Vinson Patient : 1955   MRN: [id number]  Reason for Admission:   COVID+  Discharge Date: 21 RARS: Readmission Risk Score: 15      Last Discharge [facility]       Complaint Diagnosis Description Type Department Provider    1/3/21 URI COVID-19 . .. ED to Hosp-Admission (Discharged) (ADMIT) [address] Norm Peters MD; Jarod Goncalves. .. Follow Up:  COVID-19 Risk Monitoring:    COVID-19:  Detected 1/3/21    Attempted to reach patient for Care Transition follow up. No answer to phone. Message left with CTN contact information and request for call back. No future appointments.     Rebbecca Paget, RN

## (undated) DEVICE — DRAIN SURG 15FR SIL RND CHN W/ TRCR FULL FLUT DBL WRP TRAD

## (undated) DEVICE — GLOVE ORANGE PI 7   MSG9070

## (undated) DEVICE — SOLUTION IV 1000ML 0.9% SOD CHL FOR IRRIG PLAS CONT

## (undated) DEVICE — Device

## (undated) DEVICE — APPLIER CLP M L L11.4IN DIA10MM ENDOSCP ROT MULT FOR LIG

## (undated) DEVICE — RESERVOIR,SUCTION,100CC,SILICONE: Brand: MEDLINE

## (undated) DEVICE — SUTURE SZ 0 27IN 5/8 CIR UR-6  TAPER PT VIOLET ABSRB VICRYL J603H

## (undated) DEVICE — COVER,C-ARM,41X74: Brand: MEDLINE

## (undated) DEVICE — TUBING INSUFFLATOR HEAT HI FLO SET PNEUMOCLEAR

## (undated) DEVICE — LAPAROSCOPIC TROCAR SLEEVE/SINGLE USE: Brand: KII® OPTICAL ACCESS SYSTEM

## (undated) DEVICE — ELECTRODE ES AD CRDLSS PT RET REM POLYHESIVE

## (undated) DEVICE — DRAIN SURG 15FR SIL SMOOTH RND 1/8IN END PERF W/ TRCR RADPQ

## (undated) DEVICE — BLADE CLIPPER GEN PURP NS

## (undated) DEVICE — SUTURE MCRYL SZ 4-0 L18IN ABSRB UD L19MM PS-2 3/8 CIR PRIM Y496G

## (undated) DEVICE — BAG SPEC REM 224ML W4XL6IN DIA10MM 1 HND GYN DISP ENDOPCH

## (undated) DEVICE — GLOVE ORANGE PI 8   MSG9080

## (undated) DEVICE — APPLICATOR MEDICATED 26 CC SOLUTION HI LT ORNG CHLORAPREP

## (undated) DEVICE — TROCAR: Brand: KII® SLEEVE

## (undated) DEVICE — SYRINGE MED 20ML STD CLR PLAS LUERLOCK TIP N CTRL DISP

## (undated) DEVICE — CONTAINER,SPECIMEN,OR STERILE,4OZ: Brand: MEDLINE

## (undated) DEVICE — ADHESIVE SKIN CLSR 0.7ML TOP DERMBND ADV

## (undated) DEVICE — CHLORAPREP 26ML ORANGE

## (undated) DEVICE — GLOVE ORANGE PI 7 1/2   MSG9075

## (undated) DEVICE — AGENT HEMSTAT W2XL4IN OXIDIZED REGENERATED CELOS ABSRB

## (undated) DEVICE — Z DUP USE 2641840 CLIP INT L POLYMER LOK LIG HEM O LOK

## (undated) DEVICE — TROCAR: Brand: KII FIOS FIRST ENTRY

## (undated) DEVICE — TOWEL,OR,DSP,ST,BLUE,STD,6/PK,12PK/CS: Brand: MEDLINE

## (undated) DEVICE — PACK SURG LAP CHOLE